# Patient Record
Sex: FEMALE | Race: WHITE | NOT HISPANIC OR LATINO | Employment: PART TIME
[De-identification: names, ages, dates, MRNs, and addresses within clinical notes are randomized per-mention and may not be internally consistent; named-entity substitution may affect disease eponyms.]

---

## 2017-07-31 ENCOUNTER — RECORDS - HEALTHEAST (OUTPATIENT)
Dept: ADMINISTRATIVE | Facility: OTHER | Age: 52
End: 2017-07-31

## 2017-08-08 ENCOUNTER — AMBULATORY - HEALTHEAST (OUTPATIENT)
Dept: PULMONOLOGY | Facility: OTHER | Age: 52
End: 2017-08-08

## 2017-08-08 ENCOUNTER — COMMUNICATION - HEALTHEAST (OUTPATIENT)
Dept: PULMONOLOGY | Facility: OTHER | Age: 52
End: 2017-08-08

## 2017-08-08 DIAGNOSIS — J44.9 COPD (CHRONIC OBSTRUCTIVE PULMONARY DISEASE) (H): ICD-10-CM

## 2017-08-15 ENCOUNTER — COMMUNICATION - HEALTHEAST (OUTPATIENT)
Dept: PULMONOLOGY | Facility: OTHER | Age: 52
End: 2017-08-15

## 2017-09-11 ENCOUNTER — HOSPITAL ENCOUNTER (OUTPATIENT)
Dept: RESPIRATORY THERAPY | Facility: CLINIC | Age: 52
Discharge: HOME | End: 2017-09-11
Attending: INTERNAL MEDICINE

## 2017-09-11 DIAGNOSIS — J44.9 COPD (CHRONIC OBSTRUCTIVE PULMONARY DISEASE) (H): ICD-10-CM

## 2017-09-18 ENCOUNTER — RECORDS - HEALTHEAST (OUTPATIENT)
Dept: ADMINISTRATIVE | Facility: OTHER | Age: 52
End: 2017-09-18

## 2017-09-18 ENCOUNTER — OFFICE VISIT - HEALTHEAST (OUTPATIENT)
Dept: PULMONOLOGY | Facility: OTHER | Age: 52
End: 2017-09-18

## 2017-09-18 DIAGNOSIS — J44.9 CHRONIC OBSTRUCTIVE PULMONARY DISEASE, UNSPECIFIED COPD TYPE (H): ICD-10-CM

## 2017-09-18 ASSESSMENT — MIFFLIN-ST. JEOR: SCORE: 1638.69

## 2017-11-06 ENCOUNTER — AMBULATORY - HEALTHEAST (OUTPATIENT)
Dept: PULMONOLOGY | Facility: OTHER | Age: 52
End: 2017-11-06

## 2017-11-06 DIAGNOSIS — J44.9 COPD (CHRONIC OBSTRUCTIVE PULMONARY DISEASE) (H): ICD-10-CM

## 2017-12-11 ENCOUNTER — OFFICE VISIT - HEALTHEAST (OUTPATIENT)
Dept: PULMONOLOGY | Facility: OTHER | Age: 52
End: 2017-12-11

## 2017-12-11 DIAGNOSIS — J45.901 ASTHMA EXACERBATION: ICD-10-CM

## 2018-01-08 ENCOUNTER — COMMUNICATION - HEALTHEAST (OUTPATIENT)
Dept: PULMONOLOGY | Facility: OTHER | Age: 53
End: 2018-01-08

## 2018-01-08 DIAGNOSIS — J44.9 CHRONIC OBSTRUCTIVE PULMONARY DISEASE, UNSPECIFIED COPD TYPE (H): ICD-10-CM

## 2018-02-12 ENCOUNTER — COMMUNICATION - HEALTHEAST (OUTPATIENT)
Dept: PULMONOLOGY | Facility: OTHER | Age: 53
End: 2018-02-12

## 2018-02-13 ENCOUNTER — AMBULATORY - HEALTHEAST (OUTPATIENT)
Dept: PULMONOLOGY | Facility: OTHER | Age: 53
End: 2018-02-13

## 2018-02-13 ENCOUNTER — OFFICE VISIT - HEALTHEAST (OUTPATIENT)
Dept: PULMONOLOGY | Facility: OTHER | Age: 53
End: 2018-02-13

## 2018-02-13 DIAGNOSIS — R91.1 LUNG NODULE: ICD-10-CM

## 2018-02-13 DIAGNOSIS — J45.901 EXACERBATION OF ASTHMA, UNSPECIFIED ASTHMA SEVERITY, UNSPECIFIED WHETHER PERSISTENT: ICD-10-CM

## 2018-02-13 DIAGNOSIS — K21.9 GASTROESOPHAGEAL REFLUX DISEASE WITHOUT ESOPHAGITIS: ICD-10-CM

## 2018-02-13 DIAGNOSIS — Z72.0 TOBACCO USE: ICD-10-CM

## 2018-02-13 DIAGNOSIS — J20.9 ACUTE BRONCHITIS, UNSPECIFIED ORGANISM: ICD-10-CM

## 2018-02-13 DIAGNOSIS — J44.9 CHRONIC OBSTRUCTIVE PULMONARY DISEASE, UNSPECIFIED COPD TYPE (H): ICD-10-CM

## 2018-02-13 DIAGNOSIS — M79.89 LEG SWELLING: ICD-10-CM

## 2018-02-15 ENCOUNTER — COMMUNICATION - HEALTHEAST (OUTPATIENT)
Dept: PULMONOLOGY | Facility: OTHER | Age: 53
End: 2018-02-15

## 2018-03-22 ENCOUNTER — AMBULATORY - HEALTHEAST (OUTPATIENT)
Dept: PULMONOLOGY | Facility: OTHER | Age: 53
End: 2018-03-22

## 2018-03-22 ENCOUNTER — OFFICE VISIT - HEALTHEAST (OUTPATIENT)
Dept: PULMONOLOGY | Facility: OTHER | Age: 53
End: 2018-03-22

## 2018-03-22 DIAGNOSIS — J45.909 ASTHMA: ICD-10-CM

## 2018-07-09 ENCOUNTER — OFFICE VISIT - HEALTHEAST (OUTPATIENT)
Dept: PULMONOLOGY | Facility: OTHER | Age: 53
End: 2018-07-09

## 2018-07-09 DIAGNOSIS — J45.909 ASTHMA: ICD-10-CM

## 2018-08-12 ENCOUNTER — COMMUNICATION - HEALTHEAST (OUTPATIENT)
Dept: PULMONOLOGY | Facility: OTHER | Age: 53
End: 2018-08-12

## 2018-08-12 DIAGNOSIS — J44.9 CHRONIC OBSTRUCTIVE PULMONARY DISEASE, UNSPECIFIED COPD TYPE (H): ICD-10-CM

## 2018-08-22 ENCOUNTER — HOSPITAL ENCOUNTER (OUTPATIENT)
Dept: CT IMAGING | Facility: CLINIC | Age: 53
Discharge: HOME | End: 2018-08-22
Attending: INTERNAL MEDICINE

## 2018-08-22 DIAGNOSIS — J44.9 CHRONIC OBSTRUCTIVE PULMONARY DISEASE, UNSPECIFIED COPD TYPE (H): ICD-10-CM

## 2018-08-22 DIAGNOSIS — J45.901 EXACERBATION OF ASTHMA, UNSPECIFIED ASTHMA SEVERITY, UNSPECIFIED WHETHER PERSISTENT: ICD-10-CM

## 2018-08-22 DIAGNOSIS — M79.89 LEG SWELLING: ICD-10-CM

## 2018-08-22 DIAGNOSIS — R91.1 LUNG NODULE: ICD-10-CM

## 2018-08-22 DIAGNOSIS — K21.9 GASTROESOPHAGEAL REFLUX DISEASE WITHOUT ESOPHAGITIS: ICD-10-CM

## 2018-08-22 DIAGNOSIS — Z72.0 TOBACCO USE: ICD-10-CM

## 2018-08-22 DIAGNOSIS — J20.9 ACUTE BRONCHITIS, UNSPECIFIED ORGANISM: ICD-10-CM

## 2018-10-05 ENCOUNTER — AMBULATORY - HEALTHEAST (OUTPATIENT)
Dept: PULMONOLOGY | Facility: OTHER | Age: 53
End: 2018-10-05

## 2018-10-05 DIAGNOSIS — J45.909 ASTHMA: ICD-10-CM

## 2018-10-06 ENCOUNTER — COMMUNICATION - HEALTHEAST (OUTPATIENT)
Dept: PULMONOLOGY | Facility: OTHER | Age: 53
End: 2018-10-06

## 2018-10-06 DIAGNOSIS — J45.21 MILD INTERMITTENT ASTHMA WITH EXACERBATION: ICD-10-CM

## 2018-11-17 ENCOUNTER — COMMUNICATION - HEALTHEAST (OUTPATIENT)
Dept: PULMONOLOGY | Facility: OTHER | Age: 53
End: 2018-11-17

## 2018-11-17 DIAGNOSIS — J45.21 MILD INTERMITTENT ASTHMA WITH EXACERBATION: ICD-10-CM

## 2019-03-21 ENCOUNTER — RECORDS - HEALTHEAST (OUTPATIENT)
Dept: LAB | Facility: CLINIC | Age: 54
End: 2019-03-21

## 2019-03-22 LAB — BNP SERPL-MCNC: 17 PG/ML (ref 0–80)

## 2019-03-25 ENCOUNTER — RECORDS - HEALTHEAST (OUTPATIENT)
Dept: LAB | Facility: CLINIC | Age: 54
End: 2019-03-25

## 2019-03-25 LAB
ALBUMIN SERPL-MCNC: 3.9 G/DL (ref 3.5–5)
ALP SERPL-CCNC: 136 U/L (ref 45–120)
ALT SERPL W P-5'-P-CCNC: 39 U/L (ref 0–45)
ANION GAP SERPL CALCULATED.3IONS-SCNC: 14 MMOL/L (ref 5–18)
AST SERPL W P-5'-P-CCNC: 25 U/L (ref 0–40)
BILIRUB SERPL-MCNC: 0.4 MG/DL (ref 0–1)
BUN SERPL-MCNC: 9 MG/DL (ref 8–22)
C REACTIVE PROTEIN LHE: 3.7 MG/DL (ref 0–0.8)
CALCIUM SERPL-MCNC: 10 MG/DL (ref 8.5–10.5)
CHLORIDE BLD-SCNC: 94 MMOL/L (ref 98–107)
CK SERPL-CCNC: 192 U/L (ref 30–190)
CO2 SERPL-SCNC: 30 MMOL/L (ref 22–31)
CREAT SERPL-MCNC: 0.78 MG/DL (ref 0.6–1.1)
ERYTHROCYTE [SEDIMENTATION RATE] IN BLOOD BY WESTERGREN METHOD: 55 MM/HR (ref 0–20)
GFR SERPL CREATININE-BSD FRML MDRD: >60 ML/MIN/1.73M2
GLUCOSE BLD-MCNC: 131 MG/DL (ref 70–125)
POTASSIUM BLD-SCNC: 3.8 MMOL/L (ref 3.5–5)
PROT SERPL-MCNC: 8 G/DL (ref 6–8)
SODIUM SERPL-SCNC: 138 MMOL/L (ref 136–145)
TSH SERPL DL<=0.005 MIU/L-ACNC: 0.96 UIU/ML (ref 0.3–5)

## 2019-03-27 ENCOUNTER — RECORDS - HEALTHEAST (OUTPATIENT)
Dept: LAB | Facility: CLINIC | Age: 54
End: 2019-03-27

## 2019-03-27 LAB — RHEUMATOID FACT SERPL-ACNC: <15 IU/ML (ref 0–30)

## 2019-03-28 LAB
ANA SER QL: 0.5 U
CCP AB SER IA-ACNC: 1.9 U/ML

## 2019-04-15 ENCOUNTER — COMMUNICATION - HEALTHEAST (OUTPATIENT)
Dept: PULMONOLOGY | Facility: OTHER | Age: 54
End: 2019-04-15

## 2019-04-15 DIAGNOSIS — J45.909 ASTHMA: ICD-10-CM

## 2019-04-29 ENCOUNTER — RECORDS - HEALTHEAST (OUTPATIENT)
Dept: LAB | Facility: CLINIC | Age: 54
End: 2019-04-29

## 2019-04-29 LAB
C REACTIVE PROTEIN LHE: 2.1 MG/DL (ref 0–0.8)
ERYTHROCYTE [SEDIMENTATION RATE] IN BLOOD BY WESTERGREN METHOD: 40 MM/HR (ref 0–20)

## 2019-05-06 ENCOUNTER — HOSPITAL ENCOUNTER (OUTPATIENT)
Dept: MAMMOGRAPHY | Facility: CLINIC | Age: 54
Discharge: HOME | End: 2019-05-06
Attending: FAMILY MEDICINE

## 2019-05-06 DIAGNOSIS — Z12.31 VISIT FOR SCREENING MAMMOGRAM: ICD-10-CM

## 2019-07-29 ENCOUNTER — AMBULATORY - HEALTHEAST (OUTPATIENT)
Dept: PULMONOLOGY | Facility: OTHER | Age: 54
End: 2019-07-29

## 2019-07-29 ENCOUNTER — COMMUNICATION - HEALTHEAST (OUTPATIENT)
Dept: PULMONOLOGY | Facility: OTHER | Age: 54
End: 2019-07-29

## 2019-07-29 DIAGNOSIS — J44.89 CHRONIC OBSTRUCTIVE AIRWAY DISEASE WITH ASTHMA (H): ICD-10-CM

## 2019-08-29 ENCOUNTER — OFFICE VISIT - HEALTHEAST (OUTPATIENT)
Dept: PULMONOLOGY | Facility: OTHER | Age: 54
End: 2019-08-29

## 2019-08-29 DIAGNOSIS — J44.89 CHRONIC OBSTRUCTIVE AIRWAY DISEASE WITH ASTHMA (H): ICD-10-CM

## 2019-08-29 DIAGNOSIS — Z72.0 TOBACCO USE: ICD-10-CM

## 2019-11-08 ENCOUNTER — AMBULATORY - HEALTHEAST (OUTPATIENT)
Dept: PULMONOLOGY | Facility: OTHER | Age: 54
End: 2019-11-08

## 2020-04-28 ENCOUNTER — OFFICE VISIT - HEALTHEAST (OUTPATIENT)
Dept: FAMILY MEDICINE | Facility: CLINIC | Age: 55
End: 2020-04-28

## 2020-04-28 ENCOUNTER — VIRTUAL VISIT (OUTPATIENT)
Dept: FAMILY MEDICINE | Facility: OTHER | Age: 55
End: 2020-04-28

## 2020-04-28 DIAGNOSIS — R05.9 COUGH: ICD-10-CM

## 2020-04-28 NOTE — PROGRESS NOTES
"Date: 2020 10:33:51  Clinician: Wayne Dickerson  Clinician NPI: [id number]  Patient: Rosa Arreguin  Patient : 1965  Patient Address: [facility] [place] [address]  Patient Phone: [phone]  Visit Protocol: URI  Patient Summary:  Rosa is a 55 year old ( : 1965 ) female who initiated a Visit for COVID-19 (Coronavirus) evaluation and screening. When asked the question \"Please sign me up to receive news, health information and promotions from [employer].\", Rosa responded \"No\".    Rosa states her symptoms started 1-2 days ago.   Her symptoms consist of a sore throat and wheezing.   Symptom details     Sore throat: Rosa reports having mild throat pain (1-3 on a 10 point pain scale), does not have exudate on her tonsils, and can swallow liquids. The lymph nodes in her neck are not enlarged. A rash has not appeared on the skin since the sore throat started.     Wheezing: Rosa has been diagnosed with asthma. The wheezing does not interfere with her normal daily activities.     Rosa denies having facial pain or pressure, cough, nasal congestion, malaise, ear pain, fever, myalgias, rhinitis, headache, enlarged lymph nodes, chills, vomiting, nausea, teeth pain, ageusia, anosmia, and diarrhea. She also denies taking antibiotic medication for the symptoms and having recent facial or sinus surgery in the past 60 days. She is not experiencing dyspnea.   Precipitating events  Within the past week, Rosa has not been exposed to someone with strep throat.   Pertinent COVID-19 (Coronavirus) information  Rosa has not traveled internationally or to the areas where COVID-19 (Coronavirus) is widespread, including cruise ship travel in the last 14 days before the start of her symptoms.   Rosa does not work or volunteer as healthcare worker or a  and does not work or volunteer in a healthcare facility.   She does not live with a healthcare worker.   Rosa has not had a close " contact with a laboratory-confirmed COVID-19 patient within 14 days of symptom onset. She also has not had a close contact with a suspected COVID-19 patient within 14 days of symptom onset.   Pertinent medical history  Rosa does not get yeast infections when she takes antibiotics.   Rosa does not need a return to work/school note.   Weight: 240 lbs   Rosa does not smoke or use smokeless tobacco.   Additional information as reported by the patient (free text): I take medications for asthma and COPD   Weight: 240 lbs    MEDICATIONS: No current medications, ALLERGIES: NKDA  Clinician Response:  Dear Rosa,   Your symptoms show that you may have coronavirus (COVID-19). This illness can cause fever, cough and trouble breathing. Many people get a mild case and get better on their own. Some people can get very sick.   Will I be tested for COVID-19?  We would recommend you be tested for coronavirus. Here is how to get that scheduled:   Call [phone]. Tell them you were referred by [facility] to have a COVID-19 test. You will be scheduled at one of our [facility] testing locations (drive-up). Please have your [facility] visit information ready when you call including your visit ID number to verify you were referred.    How can I protect others in the meantime?  First, stay home and away from others (self-isolate) until:   You've had no fever---and no medicine that reduces fever---for 3 full days (72 hours). And...    Your other symptoms have gotten better. For example, your cough or breathing has improved. And...    At least 7 days have passed since your symptoms started.   During this time:   Don't go to work, school or anywhere else.     Stay away from others in your home. No hugging, kissing or shaking hands.    Don't let anyone visit.    Cover your mouth and nose with a mask, tissue or wash cloth to avoid spreading germs.    Wash your hands and face often. Use soap and water.   How can I take care of myself?  1.Take  Tylenol (acetaminophen) for fever or pain. If you have liver or kidney problems, ask your family doctor if it's okay to take Tylenol.   Adults can take either:    650 mg (two 325 mg pills) every 4 to 6 hours, or...    1,000 mg (two 500 mg pills) every 8 hours as needed.     Note: Don't take more than 3,000 mg in one day.   For children, check the Tylenol bottle for the right dose. The dose is based on the child's age or weight.  2.If you have other health problems (like cancer, heart failure, an organ transplant or severe kidney disease): Call your specialty clinic if you don't feel better in the next 2 days.  3.Know when to call 911: If your breathing is so bad that it keeps you from doing normal activities, call 911 or go to the emergency room. Tell them that you've been staying home and may have COVID-19.  4.Sign up for [employer]. We know it's scary to hear that you might have COVID-19. We want to track your symptoms to make sure you're okay over the next 2 weeks. Please look for an email from [employer]---this is a free, online program that we'll use to keep in touch. To sign up, follow the link in the email. Learn more at [email or web address].  Where can I get more information?  To learn more about COVID-19 and how to care for yourself at home, please visit the CDC website at https://www.cdc.gov/coronavirus/2019-ncov/about/steps-when-sick.html.  For more about your care at [facility], please visit [email or web address]     COVID-19 (Coronavirus) General Information  Because there is currently no vaccine to prevent infection, the best way to protect yourself is to avoid being exposed to this virus. Common symptoms of COVID-19 include but are not limited to fever, cough, and shortness of breath. These symptoms appear 2-14 days after you are exposed to the virus that causes COVID-19. Click here for more information from the CDC on how to protect yourself.  If you are  sick with COVID-19 or suspect you are infected with the virus that causes COVID-19, follow the steps here from the CDC to help prevent the disease from spreading to people in your home and community.  Click here for general information from the CDC on testing.  If you develop any of these emergency warning signs for COVID-19, get medical attention immediately:     Trouble breathing    Persistent pain or pressure in the chest    New confusion or inability to arouse    Bluish lips or face      Call your doctor or clinic before going in. Call 911 if you have a medical emergency and notify the  you have or think you may have COVID-19.  For more detailed and up to date information on COVID-19 (Coronavirus), please visit the CDC website.   Diagnosis: Wheezing  Diagnosis ICD: R06.2  Addendum created: April 28 12:00:29, 2020 created by: Kee Lozano body: I reviewed the e-visit and discussed the patient with the resident and agree with the resident's assessment and plan of care as documented.

## 2020-04-30 ENCOUNTER — COMMUNICATION - HEALTHEAST (OUTPATIENT)
Dept: FAMILY MEDICINE | Facility: CLINIC | Age: 55
End: 2020-04-30

## 2020-05-04 ENCOUNTER — COMMUNICATION - HEALTHEAST (OUTPATIENT)
Dept: SCHEDULING | Facility: CLINIC | Age: 55
End: 2020-05-04

## 2020-05-05 ENCOUNTER — COMMUNICATION - HEALTHEAST (OUTPATIENT)
Dept: PULMONOLOGY | Facility: OTHER | Age: 55
End: 2020-05-05

## 2020-05-05 ENCOUNTER — AMBULATORY - HEALTHEAST (OUTPATIENT)
Dept: PULMONOLOGY | Facility: OTHER | Age: 55
End: 2020-05-05

## 2020-05-05 DIAGNOSIS — J45.909 ASTHMA: ICD-10-CM

## 2020-06-01 ENCOUNTER — AMBULATORY - HEALTHEAST (OUTPATIENT)
Dept: PULMONOLOGY | Facility: OTHER | Age: 55
End: 2020-06-01

## 2020-06-01 DIAGNOSIS — J45.909 ASTHMA: ICD-10-CM

## 2020-06-23 ENCOUNTER — OFFICE VISIT - HEALTHEAST (OUTPATIENT)
Dept: PULMONOLOGY | Facility: OTHER | Age: 55
End: 2020-06-23

## 2020-06-23 DIAGNOSIS — J44.89 CHRONIC OBSTRUCTIVE AIRWAY DISEASE WITH ASTHMA (H): ICD-10-CM

## 2020-06-23 DIAGNOSIS — R63.5 WEIGHT GAIN: ICD-10-CM

## 2020-06-23 ASSESSMENT — MIFFLIN-ST. JEOR: SCORE: 1679.51

## 2020-07-14 ENCOUNTER — TRANSCRIBE ORDERS (OUTPATIENT)
Dept: OTHER | Age: 55
End: 2020-07-14

## 2020-07-14 DIAGNOSIS — R63.5 WEIGHT GAIN: Primary | ICD-10-CM

## 2020-07-24 ENCOUNTER — DOCUMENTATION ONLY (OUTPATIENT)
Dept: CARE COORDINATION | Facility: CLINIC | Age: 55
End: 2020-07-24

## 2020-07-29 NOTE — TELEPHONE ENCOUNTER
RECORDS RECEIVED FROM: External   DATE RECEIVED: 8.11.20   NOTES (FOR ALL VISITS) STATUS DETAILS   OFFICE NOTES from referring provider Care Everywhere 7.14.20 [place], [place]   OFFICE NOTES from other specialist N/A    ED NOTES N/A    OPERATIVE REPORT  (thyroid, pituitary, adrenal, parathyroid) N/A    MEDICATION LIST Care Everywhere    IMAGING      DEXASCAN N/A    MRI (BRAIN) N/A    XR (Chest) In process 3.17.19 [facility]   CT (HEAD/NECK/CHEST/ABDOMEN) In process 8.22.18 [facility]   NUCLEAR  N/A    ULTRASOUND (HEAD/NECK) N/A    LABS     DIABETES: HBGA1C, CREATININE, FASTING LIPIDS, MICROALBUMIN URINE, POTASSIUM, TSH, T4    THYROID: TSH, T4, CBC, THYRODLONULIN, TOTAL T3, FREE T4, CALCITONIN, CEA Care Everywhere              Action MJ 7.29.2020   Action Taken Requested images from [facility]-- received sV

## 2020-08-11 ENCOUNTER — VIRTUAL VISIT (OUTPATIENT)
Dept: ENDOCRINOLOGY | Facility: CLINIC | Age: 55
End: 2020-08-11
Attending: INTERNAL MEDICINE
Payer: COMMERCIAL

## 2020-08-11 ENCOUNTER — PRE VISIT (OUTPATIENT)
Dept: ENDOCRINOLOGY | Facility: CLINIC | Age: 55
End: 2020-08-11

## 2020-08-11 DIAGNOSIS — E66.9 OBESITY, UNSPECIFIED CLASSIFICATION, UNSPECIFIED OBESITY TYPE, UNSPECIFIED WHETHER SERIOUS COMORBIDITY PRESENT: Primary | ICD-10-CM

## 2020-08-11 NOTE — LETTER
Date:August 17, 2020      Provider requested that no letter be sent. Do not send.       [facility] Information

## 2020-08-11 NOTE — PROGRESS NOTES
Patient did not join the video visit.   Please reschedule to weight management clinic.    Tab Johnson MD  Endocrinology, Diabetes and Metabolism  [facility]

## 2020-08-11 NOTE — LETTER
8/11/2020       RE: Rosa Arreguin  [address] [place] [address]     Dear Colleague,    Thank you for referring your patient, Rosa Arreguin, to the [facility] ENDOCRINOLOGY at [facility]. Please see a copy of my visit note below.    Patient did not join the video visit.   Please reschedule to weight management clinic.    Tab Johnson MD  Endocrinology, Diabetes and Metabolism  [facility]      Again, thank you for allowing me to participate in the care of your patient.      Sincerely,    Tab Johnson MD

## 2020-09-01 ENCOUNTER — TELEPHONE (OUTPATIENT)
Dept: ENDOCRINOLOGY | Facility: CLINIC | Age: 55
End: 2020-09-01

## 2020-09-01 ENCOUNTER — VIRTUAL VISIT (OUTPATIENT)
Dept: ENDOCRINOLOGY | Facility: CLINIC | Age: 55
End: 2020-09-01
Payer: COMMERCIAL

## 2020-09-01 ENCOUNTER — PRE VISIT (OUTPATIENT)
Dept: ENDOCRINOLOGY | Facility: CLINIC | Age: 55
End: 2020-09-01

## 2020-09-01 ENCOUNTER — RECORDS - HEALTHEAST (OUTPATIENT)
Dept: ADMINISTRATIVE | Facility: OTHER | Age: 55
End: 2020-09-01

## 2020-09-01 DIAGNOSIS — J45.909 ASTHMA, UNSPECIFIED ASTHMA SEVERITY, UNSPECIFIED WHETHER COMPLICATED, UNSPECIFIED WHETHER PERSISTENT: ICD-10-CM

## 2020-09-01 DIAGNOSIS — E66.01 MORBID OBESITY (H): Primary | ICD-10-CM

## 2020-09-01 PROBLEM — F32.A DEPRESSION: Status: ACTIVE | Noted: 2020-09-01

## 2020-09-01 RX ORDER — PHENTERMINE HYDROCHLORIDE 15 MG/1
15 CAPSULE ORAL EVERY MORNING
Qty: 30 CAPSULE | Refills: 3 | Status: SHIPPED | OUTPATIENT
Start: 2020-09-01 | End: 2020-11-02

## 2020-09-01 RX ORDER — MOMETASONE FUROATE AND FORMOTEROL FUMARATE DIHYDRATE 200; 5 UG/1; UG/1
AEROSOL RESPIRATORY (INHALATION)
COMMUNITY
Start: 2020-07-24 | End: 2022-04-04

## 2020-09-01 RX ORDER — ALBUTEROL SULFATE 90 UG/1
AEROSOL, METERED RESPIRATORY (INHALATION)
COMMUNITY
Start: 2020-05-06 | End: 2022-04-04

## 2020-09-01 RX ORDER — IBUPROFEN 200 MG
400 TABLET ORAL
COMMUNITY
End: 2020-09-30

## 2020-09-01 RX ORDER — NICOTINE 21 MG/24HR
PATCH, TRANSDERMAL 24 HOURS TRANSDERMAL
COMMUNITY

## 2020-09-01 RX ORDER — TIOTROPIUM BROMIDE INHALATION SPRAY 3.12 UG/1
SPRAY, METERED RESPIRATORY (INHALATION)
COMMUNITY
Start: 2020-08-08 | End: 2022-06-03

## 2020-09-01 RX ORDER — ALBUTEROL SULFATE 90 UG/1
AEROSOL, METERED RESPIRATORY (INHALATION)
COMMUNITY
Start: 2018-11-19 | End: 2020-09-30

## 2020-09-01 RX ORDER — TOPIRAMATE 25 MG/1
TABLET, FILM COATED ORAL
Qty: 90 TABLET | Refills: 3 | Status: SHIPPED | OUTPATIENT
Start: 2020-09-01 | End: 2020-11-02

## 2020-09-01 RX ORDER — POLYETHYLENE GLYCOL 3350 17 G/17G
POWDER, FOR SOLUTION ORAL
COMMUNITY
Start: 2018-11-12

## 2020-09-01 NOTE — PROGRESS NOTES
"Rosa Arreguin is a 55 year old female who is being evaluated via a billable video visit.      The patient has been notified of following:     \"This video visit will be conducted via a call between you and your physician/provider. We have found that certain health care needs can be provided without the need for an in-person physical exam.  This service lets us provide the care you need with a video conversation.  If a prescription is necessary we can send it directly to your pharmacy.  If lab work is needed we can place an order for that and you can then stop by our lab to have the test done at a later time.    Video visits are billed at different rates depending on your insurance coverage.  Please reach out to your insurance provider with any questions.    If during the course of the call the physician/provider feels a video visit is not appropriate, you will not be charged for this service.\"    Patient has given verbal consent for Video visit? Yes  How would you like to obtain your AVS? MyChart  If you are dropped from the video visit, the video invite should be resent to: Send to e-mail at: [email or web address]  Will anyone else be joining your video visit? No        Video-Visit Details    Type of service:  Video Visit    Video Start Time: 9:00 am  Video End Time: 9:50 am    Originating Location (pt. Location): Home    Distant Location (provider location):  [facility] ENDOCRINOLOGY     Platform used for Video Visit: [facility]    Tab Johnson MD      [facility] Weight Management Consult    PATIENT:  Rosa Arreguin  MRN:         [id number]  :         1965    Dear Clinic, [facility],    I had the pleasure of seeing your patient, Rosa Arreguin.  Full intake/assessment done to determine barriers to weight loss success and develop a treatment plan.  Rosa Arreguin is a 55 year old female interested in treatment of medical problems associated with weight.  Has COPD, asthma, emphysema.     She was " referred by her pulmonary doctor for weight loss.     Weight in early adulthood was around 120 lbs.   She had her daughter at age 40. Weight before pregnancy was 120 lbs. She gained only 7 lbs, then she lost weight after delivery, and her weight went back to 120 lbs.     Stopped using cocaine since having her baby at age 40.   Drank a lot of pop/beer and ate a lot of food.     Weight in the 220s until covid-19, then stayed more at home, and gained to max of 250 lbs.     In the past 2 weeks, she has been able to lose 4 lbs to 246 lbs.   Current diet:   Quit pop altogether.   Breakfast: 9 am: fruits  Lunch: 1 pm salad + meat  Dinner: varies, watching portions    Having severe sugar cravings since starting her diet. Mostly after dinner.  May or may not have snacks in afternoon or bedtime.     Current weight is 250 lbs.     Exercise: Walks or treadmill for 20 min a day    No history of HTN or CVD. No diabetes.   No smoking (quit Jan 2020).   She takes hydrochlorothiazide (started due to shortness of breath, not due to HTN).       ROS   Constitutional: no fevers, chills, night sweats. No weight loss or fatigue. Good appetite  Eyes: no vision changes, no eye redness, no diplopia  Ears, Nose, mouth, throat: no hearing changes, no tinnitus, no rhinorrhea, no nasal congestion  Cardiovascular: no chest pain, no orthopnea or PND, no edema, no palpitations  Respiratory: no dyspnea, no cough, no sputum, no wheezing  Gastrointestinal: no nausea, no vomiting, no abdominal pain, no diarrhea, no constipation  Genitourinary: no dysuria, no frequency, no urgency, no nocturia  Musculoskeletal: no joint pains, no back pain, no cramps, no fractures  Skin: no rash, no itching, no dryness, no ulcers, no hair loss  Neurological: no headache, no weakness, no numbness, no dizziness, no tremors  Psychiatric: no anxiety, no sadness  Hematologic/lymphatic: no easy bruising, no bleeding, no palor      PAST MEDICAL HISTORY:  Patient Active Problem  List   Diagnosis     Asthma     Depression       MEDICATIONS:   Current Outpatient Medications   Medication Sig Dispense Refill     albuterol (VENTOLIN HFA) 108 (90 Base) MCG/ACT inhaler INHALE 2 PUFFS BY MOUTH FOUR TIMES DAILY AS NEEDED FOR COUGH OR WHEEZING OR SHORTNESS OF BREATH       mometasone-formoterol (DULERA) 200-5 MCG/ACT inhaler Inhale 2 puffs into the lungs       polyethylene glycol (MIRALAX) 17 g packet as needed.       tiotropium (SPIRIVA RESPIMAT) 2.5 MCG/ACT inhaler Inhale 2 puffs into the lungs       albuterol (PROAIR HFA/PROVENTIL HFA/VENTOLIN HFA) 108 (90 Base) MCG/ACT inhaler INHALE TWO PUFFS BY MOUTH FOUR TIMES DAILY AS NEEDED FOR cough/wheeze/SHORTNESS OF BREATH       DULERA 200-5 MCG/ACT inhaler INHALE 2 PUFFS BY MOUTH TWICE DAILY       ibuprofen (ADVIL/MOTRIN) 200 MG tablet Take 400 mg by mouth       nicotine (NICODERM CQ) 21 MG/24HR 24 hr patch as needed.       SPIRIVA RESPIMAT 2.5 MCG/ACT inhaler Inhale 2 puffs daily.         ALLERGIES:   Allergies   Allergen Reactions     Amoxicillin Rash     Patient notes she had previously tolerated this until recently when she was prescribed a higher dose       PHYSICAL EXAM:  There were no vitals taken for this visit.   GENERAL: Healthy, alert and no distress  EYES: Eyes grossly normal to inspection.  No discharge or erythema, or obvious scleral/conjunctival abnormalities.  RESP: No audible wheeze, cough, or visible cyanosis.  No visible retractions or increased work of breathing.    SKIN: Visible skin clear. No significant rash, abnormal pigmentation or lesions.  NEURO: Cranial nerves grossly intact.  Mentation and speech appropriate for age.  PSYCH: Mentation appears normal, affect normal/bright, judgement and insight intact, normal speech and appearance well-groomed.    Location of obesity: Central Obesity    ASSESSMENT:  Rosa is a patient with mature onset  obesity with significant element of familial/genetic influence and with current health  consequences. She does need aggressive weight loss plan due to morbid obesity, health comorbidities.  Rosa Arreguin eats a high carb diet, eats a high fat diet, uses food as a reward and has perception of intense hunger.    Her problem is complicated by a hunger disorder, strong craving/reward pathways and poor lifestyle choices      PLAN:    Diet:   - reduce calories and track: aim for <1500 Kcal/day  - improve quality, avoiding pop  - try IF 16/8 some nights of the week      Medication:  The patient will begin medication in pursuit of improved medical status as influenced by body weight. She will start phentermine and topiramate. Patient was made aware that topiramate is not approved for the treatment of obesity.  There is a mutual understanding of the goals and risks of this therapy. The patient is in agreement. She is educated on dosage regimen and possible side effects.      RTC:    4 weeks with [facility] pharmacy, 8 weeks with me  Both in medical weight management clinic.     TIME: 45 min spent on evaluation, management, counseling, education, & motivational interviewing with greater than 50 % of the total time was spent on counseling and coordinating care    Sincerely,    Tab Johnson MD

## 2020-09-01 NOTE — LETTER
"2020       RE: Rosa Arreguin  [address] [place] [address]     Dear Colleague,    Thank you for referring your patient, Rosa Arreguin, to the [facility] ENDOCRINOLOGY at [facility]. Please see a copy of my visit note below.    Rosa Arreguin is a 55 year old female who is being evaluated via a billable video visit.      The patient has been notified of following:     \"This video visit will be conducted via a call between you and your physician/provider. We have found that certain health care needs can be provided without the need for an in-person physical exam.  This service lets us provide the care you need with a video conversation.  If a prescription is necessary we can send it directly to your pharmacy.  If lab work is needed we can place an order for that and you can then stop by our lab to have the test done at a later time.    Video visits are billed at different rates depending on your insurance coverage.  Please reach out to your insurance provider with any questions.    If during the course of the call the physician/provider feels a video visit is not appropriate, you will not be charged for this service.\"    Patient has given verbal consent for Video visit? Yes  How would you like to obtain your AVS? MyChart  If you are dropped from the video visit, the video invite should be resent to: Send to e-mail at: [email or web address]  Will anyone else be joining your video visit? No        Video-Visit Details    Type of service:  Video Visit    Video Start Time: 9:00 am  Video End Time: 9:50 am    Originating Location (pt. Location): Home    Distant Location (provider location):  [facility] ENDOCRINOLOGY     Platform used for Video Visit: Patricia Johnson MD      [facility] Weight Management Consult    PATIENT:  Rosa Arreguin  MRN:         [id number]  :         1965    Dear Clinic, [facility],    I had the pleasure of seeing your " patient, Rosa Arreguin.  Full intake/assessment done to determine barriers to weight loss success and develop a treatment plan.  Rosa Arreguin is a 55 year old female interested in treatment of medical problems associated with weight.  Has COPD, asthma, emphysema.     She was referred by her pulmonary doctor for weight loss.     Weight in early adulthood was around 120 lbs.   She had her daughter at age 40. Weight before pregnancy was 120 lbs. She gained only 7 lbs, then she lost weight after delivery, and her weight went back to 120 lbs.     Stopped using cocaine since having her baby at age 40.   Drank a lot of pop/beer and ate a lot of food.     Weight in the 220s until covid-19, then stayed more at home, and gained to max of 250 lbs.     In the past 2 weeks, she has been able to lose 4 lbs to 246 lbs.   Current diet:   Quit pop altogether.   Breakfast: 9 am: fruits  Lunch: 1 pm salad + meat  Dinner: varies, watching portions    Having severe sugar cravings since starting her diet. Mostly after dinner.  May or may not have snacks in afternoon or bedtime.     Current weight is 250 lbs.     Exercise: Walks or treadmill for 20 min a day    No history of HTN or CVD. No diabetes.   No smoking (quit Jan 2020).   She takes hydrochlorothiazide (started due to shortness of breath, not due to HTN).       ROS   Constitutional: no fevers, chills, night sweats. No weight loss or fatigue. Good appetite  Eyes: no vision changes, no eye redness, no diplopia  Ears, Nose, mouth, throat: no hearing changes, no tinnitus, no rhinorrhea, no nasal congestion  Cardiovascular: no chest pain, no orthopnea or PND, no edema, no palpitations  Respiratory: no dyspnea, no cough, no sputum, no wheezing  Gastrointestinal: no nausea, no vomiting, no abdominal pain, no diarrhea, no constipation  Genitourinary: no dysuria, no frequency, no urgency, no nocturia  Musculoskeletal: no joint pains, no back pain, no cramps, no fractures  Skin: no rash,  no itching, no dryness, no ulcers, no hair loss  Neurological: no headache, no weakness, no numbness, no dizziness, no tremors  Psychiatric: no anxiety, no sadness  Hematologic/lymphatic: no easy bruising, no bleeding, no palor      PAST MEDICAL HISTORY:  Patient Active Problem List   Diagnosis     Asthma     Depression       MEDICATIONS:   Current Outpatient Medications   Medication Sig Dispense Refill     albuterol (VENTOLIN HFA) 108 (90 Base) MCG/ACT inhaler INHALE 2 PUFFS BY MOUTH FOUR TIMES DAILY AS NEEDED FOR COUGH OR WHEEZING OR SHORTNESS OF BREATH       mometasone-formoterol (DULERA) 200-5 MCG/ACT inhaler Inhale 2 puffs into the lungs       polyethylene glycol (MIRALAX) 17 g packet as needed.       tiotropium (SPIRIVA RESPIMAT) 2.5 MCG/ACT inhaler Inhale 2 puffs into the lungs       albuterol (PROAIR HFA/PROVENTIL HFA/VENTOLIN HFA) 108 (90 Base) MCG/ACT inhaler INHALE TWO PUFFS BY MOUTH FOUR TIMES DAILY AS NEEDED FOR cough/wheeze/SHORTNESS OF BREATH       DULERA 200-5 MCG/ACT inhaler INHALE 2 PUFFS BY MOUTH TWICE DAILY       ibuprofen (ADVIL/MOTRIN) 200 MG tablet Take 400 mg by mouth       nicotine (NICODERM CQ) 21 MG/24HR 24 hr patch as needed.       SPIRIVA RESPIMAT 2.5 MCG/ACT inhaler Inhale 2 puffs daily.         ALLERGIES:   Allergies   Allergen Reactions     Amoxicillin Rash     Patient notes she had previously tolerated this until recently when she was prescribed a higher dose       PHYSICAL EXAM:  There were no vitals taken for this visit.   GENERAL: Healthy, alert and no distress  EYES: Eyes grossly normal to inspection.  No discharge or erythema, or obvious scleral/conjunctival abnormalities.  RESP: No audible wheeze, cough, or visible cyanosis.  No visible retractions or increased work of breathing.    SKIN: Visible skin clear. No significant rash, abnormal pigmentation or lesions.  NEURO: Cranial nerves grossly intact.  Mentation and speech appropriate for age.  PSYCH: Mentation appears normal,  affect normal/bright, judgement and insight intact, normal speech and appearance well-groomed.    Location of obesity: Central Obesity    ASSESSMENT:  Rosa is a patient with mature onset  obesity with significant element of familial/genetic influence and with current health consequences. She does need aggressive weight loss plan due to morbid obesity, health comorbidities.  Rosa Arreguin eats a high carb diet, eats a high fat diet, uses food as a reward and has perception of intense hunger.    Her problem is complicated by a hunger disorder, strong craving/reward pathways and poor lifestyle choices      PLAN:    Diet:   - reduce calories and track: aim for <1500 Kcal/day  - improve quality, avoiding pop  - try IF 16/8 some nights of the week      Medication:  The patient will begin medication in pursuit of improved medical status as influenced by body weight. She will start phentermine and topiramate. Patient was made aware that topiramate is not approved for the treatment of obesity.  There is a mutual understanding of the goals and risks of this therapy. The patient is in agreement. She is educated on dosage regimen and possible side effects.      RTC:    4 weeks with [facility] pharmacy, 8 weeks with me  Both in medical weight management clinic.     TIME: 45 min spent on evaluation, management, counseling, education, & motivational interviewing with greater than 50 % of the total time was spent on counseling and coordinating care    Sincerely,    Tab Johnson MD

## 2020-09-01 NOTE — TELEPHONE ENCOUNTER
Central Prior Authorization Team   Phone: [phone]      PA Initiation    Medication: phentermine (ADIPEX-P) 15 MG capsule  Insurance Company: [employer] - Phone [phone] Fax [phone]  Pharmacy Filling the Rx: [id number] [place], [place] - [address]  Filling Pharmacy Phone: [phone]  Filling Pharmacy Fax:    Start Date: 9/1/2020

## 2020-09-01 NOTE — PATIENT INSTRUCTIONS
MEDICATION STARTED AT THIS APPOINTMENT    We are starting Phentermine. Take one tablet in the morning.  Call the nurse at [phone] if you have any questions or concerns. (Do not stop taking it if you don't think it's working. For some people it works without them knowing it.)    Phentermine is being prescribed because you identified hunger as one of the main causes for your extra weight.      Our patients on Phentermine find that they:    >feel less hunger    >find it easier to push the plate away   >have an easier time eating less    For some of our patients, these feelings are very real and immediate. For other patients, the feelings are less obvious. They don't feel much of a change but find they've lost weight. Like all weight loss medications, Phentermine  works best when you help it work. This means:  1. Having less tempting high calorie (fattening) food around the house or office. (For people with strong cravings this is very important.)   2. Staying away from situations or people that may trigger your cravings .   3. Eating out only one time or less each week.  4. Eating your meals at a table with the TV or computer off.    Side-effects. Phentermine is generally well tolerated. The main side-effects we see are feelings of racing pulse or rapid heart beat. Some people can get an elevated blood pressure. Because of this we may have you come back within a week or so of starting the medication for a blood pressure check.         In order to get refills of this or any medication we prescribe you must be seen in the medical weight mgmt clinic every 2-3 months. Please have your pharmacy fax a refill request to [phone].      MEDICATION STARTED AT THIS APPOINTMENT  We are starting topiramate at bedtime.  Start one tab, 25 mg, for a week. Go up to 50 mg (2 tabs) for the next week. At the third week, take   3 tabs (75 mg).  Stay at 3 tabs until you are seen again. Call the nurse at [phone] if you have  any questions or concerns. (Do not stop taking it if you don't think it's working. For some people it works even though they do not feel much different.)    Topiramate (Topamax) is a medication that is used most often to treat migraine headaches or for seizures. It has also been found to help with weight loss. Although it's not currently FDA approved for weight loss, it has been used safely for a number of years to help people who are carrying extra weight.     Just how topiramate helps with weight loss has not been exactly determined. However it seems to work on areas of the brain to quiet down signals related to eating.      Topiramate may make you:    >feel less interest in eating in between meals   >think less about food and eating   >find it easier to push the plate away   >find giving up pop easier    >have an easier time eating less    For some of our patients, the pills work right away. They feel and think quite differently about food. Other patients don't feel much of a change but find in fact they have lost weight! Like all weight loss medications, topiramate works best when you help it work.  This means:    1) Have less tempting high calorie (fattening) food around the house or office    2) Have lower calorie food (fruits, vegetables,low fat meats and dairy) for snacks    3) Eat out only one time or less each week.   4) Eat your meals at a table with the TV or computer off.    Side-effects. Topiramate is generally well tolerated. The main side-effects we see are:   Tingling in hands,feet, or face (usually not very troublesome)   Mental confusion and word finding trouble (about 10% of patients have this.)     Feeling sleepy or a bit dopey- this goes away very soon after starting.    One of the dangers of topiramate is the possibility of birth defects--if you get pregnant when you are on it, there is the risk that your baby will be born with a cleft lip or palate.  If you are on topiramate and of child  bearing age, you need to be on a reliable form of birth control or refrain from sexual intercourse.     Please refer to the pharmacy insert for more information on side-effects. Since many pharmacists are not familiar with the use of topiramate in weight loss, calling the clinic will get you the most accurate information on the use of this medication for weight loss.     In order to get refills of this or any medication we prescribe you must be seen in the medical weight mgmt clinic every 2-3 months. Please have your pharmacy fax a refill request to [phone].

## 2020-09-02 NOTE — TELEPHONE ENCOUNTER
PRIOR AUTHORIZATION DENIED    Medication: phentermine (ADIPEX-P) 15 MG capsule-PA Denied    Denial Date: 9/1/2020    Denial Rational: All weight loss medications are excluded        Appeal Information:

## 2020-09-03 ENCOUNTER — TELEPHONE (OUTPATIENT)
Dept: ENDOCRINOLOGY | Facility: CLINIC | Age: 55
End: 2020-09-03

## 2020-09-03 NOTE — TELEPHONE ENCOUNTER
MTM referral from: [facility] visit (referral by provider)    MTM referral outreach attempt #2 on September 3, 2020 at 2:56 PM      Outcome: Patient not reachable after several attempts, will route to MTM Pharmacist/Provider as an FYI. Thank you for the referral.    Stephanie Young, MTM Coordinator

## 2020-09-04 ENCOUNTER — RECORDS - HEALTHEAST (OUTPATIENT)
Dept: LAB | Facility: CLINIC | Age: 55
End: 2020-09-04

## 2020-09-04 LAB
ALBUMIN SERPL-MCNC: 4.2 G/DL (ref 3.5–5)
ALP SERPL-CCNC: 116 U/L (ref 45–120)
ALT SERPL W P-5'-P-CCNC: 50 U/L (ref 0–45)
ANION GAP SERPL CALCULATED.3IONS-SCNC: 11 MMOL/L (ref 5–18)
AST SERPL W P-5'-P-CCNC: 41 U/L (ref 0–40)
BILIRUB SERPL-MCNC: 0.5 MG/DL (ref 0–1)
BUN SERPL-MCNC: 12 MG/DL (ref 8–22)
CALCIUM SERPL-MCNC: 9.7 MG/DL (ref 8.5–10.5)
CHLORIDE BLD-SCNC: 100 MMOL/L (ref 98–107)
CO2 SERPL-SCNC: 27 MMOL/L (ref 22–31)
CREAT SERPL-MCNC: 0.77 MG/DL (ref 0.6–1.1)
GFR SERPL CREATININE-BSD FRML MDRD: >60 ML/MIN/1.73M2
GLUCOSE BLD-MCNC: 117 MG/DL (ref 70–125)
POTASSIUM BLD-SCNC: 4 MMOL/L (ref 3.5–5)
PROT SERPL-MCNC: 7.9 G/DL (ref 6–8)
SODIUM SERPL-SCNC: 138 MMOL/L (ref 136–145)
TSH SERPL DL<=0.005 MIU/L-ACNC: 1.57 UIU/ML (ref 0.3–5)

## 2020-09-30 ENCOUNTER — ALLIED HEALTH/NURSE VISIT (OUTPATIENT)
Dept: PHARMACY | Facility: CLINIC | Age: 55
End: 2020-09-30
Payer: COMMERCIAL

## 2020-09-30 DIAGNOSIS — G25.81 RESTLESS LEG SYNDROME: ICD-10-CM

## 2020-09-30 DIAGNOSIS — E66.813 CLASS 3 SEVERE OBESITY DUE TO EXCESS CALORIES WITH SERIOUS COMORBIDITY AND BODY MASS INDEX (BMI) OF 40.0 TO 44.9 IN ADULT (H): Primary | ICD-10-CM

## 2020-09-30 DIAGNOSIS — I10 BENIGN ESSENTIAL HYPERTENSION: ICD-10-CM

## 2020-09-30 DIAGNOSIS — E11.9 TYPE 2 DIABETES MELLITUS WITHOUT COMPLICATION, WITHOUT LONG-TERM CURRENT USE OF INSULIN (H): ICD-10-CM

## 2020-09-30 DIAGNOSIS — J44.9 CHRONIC OBSTRUCTIVE PULMONARY DISEASE, UNSPECIFIED COPD TYPE (H): ICD-10-CM

## 2020-09-30 DIAGNOSIS — K59.00 CONSTIPATION, UNSPECIFIED CONSTIPATION TYPE: ICD-10-CM

## 2020-09-30 DIAGNOSIS — E66.01 CLASS 3 SEVERE OBESITY DUE TO EXCESS CALORIES WITH SERIOUS COMORBIDITY AND BODY MASS INDEX (BMI) OF 40.0 TO 44.9 IN ADULT (H): Primary | ICD-10-CM

## 2020-09-30 DIAGNOSIS — J45.909 ASTHMA, UNSPECIFIED ASTHMA SEVERITY, UNSPECIFIED WHETHER COMPLICATED, UNSPECIFIED WHETHER PERSISTENT: ICD-10-CM

## 2020-09-30 PROCEDURE — 99607 MTMS BY PHARM ADDL 15 MIN: CPT | Performed by: PHARMACIST

## 2020-09-30 PROCEDURE — 99605 MTMS BY PHARM NP 15 MIN: CPT | Performed by: PHARMACIST

## 2020-09-30 RX ORDER — LOSARTAN POTASSIUM 25 MG/1
1 TABLET ORAL DAILY
COMMUNITY
Start: 2020-09-28 | End: 2021-11-17

## 2020-09-30 RX ORDER — GABAPENTIN 300 MG/1
300-600 CAPSULE ORAL AT BEDTIME
COMMUNITY
Start: 2020-09-24

## 2020-09-30 NOTE — PROGRESS NOTES
MTM ENCOUNTER  SUBJECTIVE/OBJECTIVE:                           Rosa Arreguin is a 55 year old female called for an initial visit. She was referred to me from Dr. Johnson.      Patient consented to a telehealth visit: yes  Telemedicine Visit Details  Type of service:  Telephone visit  Start Time: 4:48 PM Was able to connect with patient on second attempt in calling her.   End Time: 5:30 PM  Originating Location (pt. Location): Home  Distant Location (provider location):  [facility] [place]  Mode of Communication:  Telephone    Chief Complaint: comprehensive review of medications - patient wanted to discuss weight loss medications, blood pressure and new diabetes diagnosis.    Allergies/ADRs: Reviewed in chart  Tobacco: Quit smoking in January   Alcohol: Less than 1 beverages / week  Caffeine: 1 cups/day of coffee, stopped soda drinking.   Activity: See below.   PMH: Reviewed in chart    Medication Adherence/Access: No issues reported.     Obesity:   Phentermine 15 mg once daily  Topiramate 75 mg once daily     Followed by Dr. Tab Johnson, seen 9/01/2020 for New Medical Weight Management. Patient is experiencing the follow side effects: taste perversion, dry mouth, elevated blood pressure (?). Reports she has noticed increased BP with starting phentermine, went to PCP due to this and blood pressure medications switched around. Reports she has been at 237 lb for ~10 days. She was eating smaller portions prior to starting weight loss medications, but notices it has been easier to do this when started phentermine. She is worried that phentermine causing high blood pressure, see below for more information.  Diet/Eating Habits: Patient reports she has a great deal of sugar cravings that have been diminished by topirmate. She easts 3 times per day but will snack between meals, not as frequently since starting topiramate. She reports that she quit drinking regular soda (2 liters regular soda). She reports that she is  surprised she doesn't miss drinking soda. Switched to water.   Exercise/Activity: Patient reports walks on treadmill 20 minutes daily.     Current weight today: 237 lb   Initial Consult Weight : 252 lb    Cumulative Weight Loss: -15 lb     Care Everywhere Vitals 9/24/2020  Height: 62.5 inches  BMI: 43.6 kg/m^2    CareEverywhere Labs 9/4/2020   Alk Phos 116  AST 41  ALT 50     Type 2 Diabetes:    Metformin 500 mg BID - recent start    New diagnosis of type 2 diabetes from primary care over the last month. Was started on metformin after lab results on 9/4/2020. Reports that prior to this information she has made changes to nutrition (see above), most pertinent to blood sugar history is stopping soda that she was previously drinking 2 L regular soda/day. Less sweets. If eating sweets now, will be fresh fruit. Pt is not experiencing side effects. She would prefer not to start testing blood sugars as of now.   Blood sugar monitoring: never.   Symptoms of low blood sugar? none  Symptoms of high blood sugar? none  Eye exam: up to date  Foot exam: up to date  Aspirin: Not taking due to new diagnosis.   Statin: No - unable to calculate 10 year ASCVD risk as Lipid panel > 1 year ago  ACEi/ARB: Yes: losartan.   Urine Albumin: No results found for: UMALCR     CareEverywhere Labs 9/4/2020   Hemoglobin A1c 6.8%    Hypertension:   Losartan 25 mg once daily - recent start     Many changes to blood pressure medications over the last month - Hydrochlorothiazide patient reports for breathing, not for high blood pressure. Hydrochlorothiazide changed to lisinopril (unclear indication), then lisinopril switched to losartan due to cough from lisinopril. Patient does self-monitor BP. Patient reports no current medication side effects. Prior to starting phentermine, reports blood pressure was 120s/70s mmHg. Then reports after starting phentermine then went to 140s/90s. She has not tested blood pressure since changing to losartan, has been  on losartan for 3-4 days. Following up with PCP in 3 weeks for BP recheck.   BP Readings from Last 3 Encounters:   No data found for BP     Care Everywhere Vitals 9/24/2020  Blood pressure: 144/98 mmHg     Care Everywhere Labs 9/4/2020   Creatinine 0.77  GFR >60   Potassium 4    Asthma/COPD:   ICS/LABA- Dulera 2 puff(s) twice daily  Spiriva 2 puffs daily.    Reports that she was put on hydrochlorothiazide 25 mg daily over a year ago by Pulmonology for breathing and found that it helped with breathing.She is worried that stopping hydrochlorothiazide will cause breathing issues, but thus far no issues. Previously describes she quit hydrochlorothiazide at one point when quit smoking in January, but 6 months after noticed breathing wasn't better so restarted hydrochlorothiazide and within days breathing improved. Reports currently no SOB at rest. No wheezing. No chest pain or tightness. Reports potentially more phlegm she noticed today but this is new. Hx bronchitis ~ 2 times per year.  Pt rinses their mouth after using steroid inhaler. Asthma triggers include: humidity and weight gain.  Pt reports the following symptoms: none. Followed by Pulmonology, last seen 8/29/2019. Hx trying multiple inhalers prior to this regimen. Up until recently had been doing well on solely utilizing albuterol PRN, but over the years breathing has gotten worse, particular with weight gain.   Asthma Action Plan on file: NO    Restless Leg:   Gabapentin 300 mg daily nightly     Recently started for restless legs that have been worsening over the last several months. Finds that 300 mg nightly is working well, but was told she could go up to 900 mg nightly if needed. No ferritin level checked recently, but does report red meat (ground beef) is a staple in their family.    CareEverywhere Labs 9/4/2020   Hemoglobin 14.2     Constipation:   Miralax 1 capful PRN    Uses as needed. She tends to be more constipated consistently. Finds that since  starting metformin that she isn't having a much of issues with this. No diarrhea. Drinking more water, now 48+oz daily.     ASSESSMENT:                              Medication Adherence: No issues identified    Obesity: Weight loss progressing. Needs improvement - as phentermine may be exacerbating blood pressure, could consider lowering phentermine dose or stopping and continue solely on topiramate. Also discussed potential for addition of GLP1 agonist due to diabetes diagnosis, but patient preferred to hold off for now. Patient prefers to continue phentermine for now. See hypertension section for more information.     Type 2 Diabetes:  Blood sugars stable- A1c at goal <7% prior to metformin start. Discussed that component leading to diabetes diagnosis is related to dietary means. Would benefit from continuing to making nutritional changes and continuing with weight loss. Although metformin is first line for Type 2 diabetes, patient already at goal so could consider utilizing other options such as GLP1 agonist for weight loss benefits. Patient preferred to continue with metformin for now.  May benefit from at least moderate intensity statin therapy, but would benefit from lipid panel repeat to identify ASCVD risk to ensure high intensity statin therapy in not necessary. Due to diagnosis of diabetes, age >50, hypertension and recent smoking history with no anemia or renal disease, could benefit from low dose aspirin therapy in the future but did not have time to discuss today. Due for albumin/creatinine check in future.     Hypertension: Unknown current status. Patient would benefit from getting blood pressure rechecked both at home and at next follow up with PCP to ensure within goal <140/90 mmHg. Do not like the idea of prescribing cascade (utilizing blood pressure medication to counter side effect from phentermine, but unclear true status of blood pressure prior to phentermine). Patient prefers to no changes to  medications at this time. Losartan has some utilization with new diabetes diagnosis if found to have albumin/creatinine issues (unknown at this point). hydrochlorothiazide although could minimally cause some sugar imbalances historically has shown improvements with breathing. Therefore, will await current blood pressure status to make any considerations, defer to PCP for now as following up soon.     Asthma/COPD: Stable for now. As appears to be a strong correlation between hydrochlorothiazide and breathing status improvements, so Patient would benefit from following up with PCP if breathing worsens since stopping hydrochlorothiazide. ACT/CAT unable to be completed today but would benefit from in future.     Restless Leg: Stable. Although Hgb normal, could consider obtaining ferritin to ensure restless leg isn't related to low iron stores.     Constipation: Stable.     PLAN:                            As patient wished to continue on current medication regimen for now, see below for plan:     1. Patient to follow up with PCP regarding blood pressure. Check blood pressure at home. If blood pressure is >140 systolic and/or >90 diastolic then should consider lowering dose of phentermine or holding as do not want to lead to prescribing cascade if can help it.     2. Could consider statin in future, but should get lipid panel in near future to determine intensity of statin to consider. Patient to follow up with PCP regarding this.     3. Could consider low dose aspirin in future, but did not have time to discuss today.     4. If breathing worsens with being off hydrochlorothiazide, follow up with PCP     5. Patient to follow up with PCP about BMP, ferritin and albumin/creatinine ratio labs in future    6. ACT/CAT in future.     Future: did not have time to assess if still staking nicotine patch due to time constraints so will discuss at follow up. Metformin versus GLP1 agonist?     I spent 42 minutes with this patient  today. A copy of the visit note was provided to the patient's referring provider.    Will follow up in 1 month.    The patient declined a summary of these recommendations.     Lauren Bloch, PharmD  Medication Therapy Management Pharmacist   [facility]   Phone: [phone]

## 2020-09-30 NOTE — Clinical Note
Gary Johnson - this was a complicated visit. Patient has new diabetes diagnosis. Potentially elevated blood pressure from phentermine but unclear. Provider switched hydrochlorothiazide --> lisinopril then --> losartan over the last month, but hydrochlorothiazide was being used for her asthma/COPD per her reports. She is getting BP repeat in 3 weeks with provider, discussed if BP remains elevated may want to consider dose decrease of phentermine or stop and talked about potential for GLP1 agonist start (oral or injectable), but PCP recently started metformin (A1c 6.8% prior to metformin start), so patient preferred not to at this time which makes sense. Just a lot of moving parts so really told her what I wanted to follow up with her on in 1 month to reasess. Phen/top working well for weight loss, so decrease phen dose is s/e related. Corinne QUICK

## 2020-11-02 ENCOUNTER — VIRTUAL VISIT (OUTPATIENT)
Dept: ENDOCRINOLOGY | Facility: CLINIC | Age: 55
End: 2020-11-02
Payer: COMMERCIAL

## 2020-11-02 VITALS — BODY MASS INDEX: 40.57 KG/M2 | HEIGHT: 63 IN | WEIGHT: 229 LBS

## 2020-11-02 DIAGNOSIS — E66.01 MORBID OBESITY (H): Primary | ICD-10-CM

## 2020-11-02 PROCEDURE — 99214 OFFICE O/P EST MOD 30 MIN: CPT | Mod: GT | Performed by: INTERNAL MEDICINE

## 2020-11-02 ASSESSMENT — PAIN SCALES - GENERAL: PAINLEVEL: NO PAIN (0)

## 2020-11-02 ASSESSMENT — MIFFLIN-ST. JEOR: SCORE: 1594.93

## 2020-11-02 NOTE — LETTER
"11/2/2020       RE: Rosa Arreguin  [address] [place] [address]     Dear Colleague,    Thank you for referring your patient, Rosa Arreguin, to the [facility] [place] [facility]. Please see a copy of my visit note below.    Rosa Arreguin is a 55 year old female who is being evaluated via a billable video visit.      The patient has been notified of following:     \"This video visit will be conducted via a call between you and your physician/provider. We have found that certain health care needs can be provided without the need for an in-person physical exam.  This service lets us provide the care you need with a video conversation.  If a prescription is necessary we can send it directly to your pharmacy.  If lab work is needed we can place an order for that and you can then stop by our lab to have the test done at a later time.    Video visits are billed at different rates depending on your insurance coverage.  Please reach out to your insurance provider with any questions.    If during the course of the call the physician/provider feels a video visit is not appropriate, you will not be charged for this service.\"    Patient has given verbal consent for Video visit? Yes  How would you like to obtain your AVS? MyChart  If you are dropped from the video visit, the video invite should be resent to: Text to cell phone: [phone]  Will anyone else be joining your video visit? No      During this virtual visit the patient is located in [place], patient verifies this as the location during the entirety of this visit.     Video-Visit Details    Type of service:  Video Visit    Video Start Time: 10:40  Video End Time: 11:05    Originating Location (pt. Location): Home    Distant Location (provider location):  [facility]     Platform used for Video Visit: Ian Johnson, " MD      Return Medical Weight Management Visit    PATIENT:  Rosa Arreguin  MRN:         [id number]  :         1965  GEOFF: 20    Dear Clinic, [facility],    I had the pleasure of seeing your patient, Rosa Arreguin.  Full intake/assessment done to determine barriers to weight loss success and develop a treatment plan.  Rosa Arreguin is a 55 year old female interested in treatment of medical problems associated with weight.  Has COPD, asthma, emphysema.     Previous history:   Weight in early adulthood was around 120 lbs. Weight gained started when she stopped using cocaine since having her baby at age 40. Weight in the 220s until covid-19, then stayed more at home, and gained to max of 250 lbs.     Interval history:   Initial visit with me 2020. She started phent/top since.   Her weight is down to 229 lbs today.     Meds:   - Phentermine 15 mg po daily.   - Topiramate 75 mg at bedtime. (until 2 weeks ago)    Her blood pressure has gone up (to 145/90s) since starting the meds.   She stopped the topiramate 2 weeks ago because it changed the taste of all beverages even water.     Current diet:   Starts eating at 10:30-11 am. Starts with fruits.   Lunch: 1 pm salad + meat, or soup and sandwich  Dinner: varies, watching portions, harder to count calories there  Tried to avoid snacking after dinner. But that is the hardest time.     Tracking calories, staying under 1500 kcal/day.  Was writing things down on a journal, but stopped recently.     Weighs herself daily.     Exercise: Walks or treadmill for 20 min a day    Weight Progress:   Initial weight at first visit to [facility]: 250 lbs on 2020  Weight change since last visit 2020 is down 21 lbs  Weight change since baseline visit: down 21 lbs    ROS   10 point ROS was performed and is negative unless specified in HPI      PAST MEDICAL HISTORY:  Patient Active Problem List   Diagnosis     Asthma     Depression       MEDICATIONS:   Current  "Outpatient Medications   Medication Sig Dispense Refill     albuterol (PROAIR HFA/PROVENTIL HFA/VENTOLIN HFA) 108 (90 Base) MCG/ACT inhaler INHALE TWO PUFFS BY MOUTH FOUR TIMES DAILY AS NEEDED FOR cough/wheeze/SHORTNESS OF BREATH       DULERA 200-5 MCG/ACT inhaler INHALE 2 PUFFS BY MOUTH TWICE DAILY       gabapentin (NEURONTIN) 300 MG capsule Take 300-600 mg by mouth At Bedtime       losartan (COZAAR) 25 MG tablet Take 1 tablet by mouth daily       metFORMIN (GLUCOPHAGE) 500 MG tablet Take 500 mg by mouth 2 times daily (with meals)       nicotine (NICODERM CQ) 21 MG/24HR 24 hr patch as needed.       phentermine (ADIPEX-P) 15 MG capsule Take 1 capsule (15 mg) by mouth every morning 30 capsule 3     polyethylene glycol (MIRALAX) 17 g packet as needed.       SPIRIVA RESPIMAT 2.5 MCG/ACT inhaler Inhale 2 puffs daily.       topiramate (TOPAMAX) 25 MG tablet 25 mg at bedtime for 1 week, 50 mg at bedtime for 1 week and 75 mg daily at bedtime thereafter 90 tablet 3       ALLERGIES:   Allergies   Allergen Reactions     Amoxicillin Rash     Patient notes she had previously tolerated this until recently when she was prescribed a higher dose     Lisinopril Cough       PHYSICAL EXAM:  Ht 1.588 m (5' 2.5\")   Wt 103.9 kg (229 lb)   BMI 41.22 kg/m     GENERAL: Healthy, alert and no distress  EYES: Eyes grossly normal to inspection.  No discharge or erythema, or obvious scleral/conjunctival abnormalities.  RESP: No audible wheeze, cough, or visible cyanosis.  No visible retractions or increased work of breathing.    SKIN: Visible skin clear. No significant rash, abnormal pigmentation or lesions.  NEURO: Cranial nerves grossly intact.  Mentation and speech appropriate for age.  PSYCH: Mentation appears normal, affect normal/bright, judgement and insight intact, normal speech and appearance well-groomed.    Location of obesity: Central Obesity    ASSESSMENT:  Rosa is a patient with mature onset  obesity with significant element of " familial/genetic influence and with current health consequences. She does need aggressive weight loss plan due to morbid obesity, health comorbidities.  Rosa Arreguin eats a high carb diet, eats a high fat diet, uses food as a reward and has perception of intense hunger.    Her problem is complicated by a hunger disorder, strong craving/reward pathways and poor lifestyle choices    She has had good success with weight loss in the past 2 months.   Has had issues with meds:   - phentermine: high BP  - topiramate: unfavorable taste of all drinks including water    PLAN:    Meds:   Stop both meds and monitor off    Diet:   - continue reduced calories aim for <1500 Kcal/day  - continue to work on quality, more fruits and veg  - try IF 16/8 some nights of the week - challenge is to avoid evening snacks      RTC:    12 weeks.     TIME: 25 min spent on evaluation, management, counseling, education, & motivational interviewing with greater than 50 % of the total time was spent on counseling and coordinating care    Sincerely,    Tab Johnson MD

## 2020-11-02 NOTE — PROGRESS NOTES
"oRsa Arreguin is a 55 year old female who is being evaluated via a billable video visit.      The patient has been notified of following:     \"This video visit will be conducted via a call between you and your physician/provider. We have found that certain health care needs can be provided without the need for an in-person physical exam.  This service lets us provide the care you need with a video conversation.  If a prescription is necessary we can send it directly to your pharmacy.  If lab work is needed we can place an order for that and you can then stop by our lab to have the test done at a later time.    Video visits are billed at different rates depending on your insurance coverage.  Please reach out to your insurance provider with any questions.    If during the course of the call the physician/provider feels a video visit is not appropriate, you will not be charged for this service.\"    Patient has given verbal consent for Video visit? Yes  How would you like to obtain your AVS? MyChart  If you are dropped from the video visit, the video invite should be resent to: Text to cell phone: [phone]  Will anyone else be joining your video visit? No      During this virtual visit the patient is located in [place], patient verifies this as the location during the entirety of this visit.     Video-Visit Details    Type of service:  Video Visit    Video Start Time: 10:40  Video End Time: 11:05    Originating Location (pt. Location): Home    Distant Location (provider location):  [facility] [place]     Platform used for Video Visit: swabr    Tab Johnson MD      Return Medical Weight Management Visit    PATIENT:  Rosa Arreguin  MRN:         [id number]  :         1965  GEOFF: 20    Dear Clinic, [facility],    I had the pleasure of seeing your patient, Rosa Arreguin.  Full intake/assessment done to determine barriers to weight loss success and develop a " treatment plan.  Rosa Arreguin is a 55 year old female interested in treatment of medical problems associated with weight.  Has COPD, asthma, emphysema.     Previous history:   Weight in early adulthood was around 120 lbs. Weight gained started when she stopped using cocaine since having her baby at age 40. Weight in the 220s until covid-19, then stayed more at home, and gained to max of 250 lbs.     Interval history:   Initial visit with me 9/1/2020. She started phent/top since.   Her weight is down to 229 lbs today.     Meds:   - Phentermine 15 mg po daily.   - Topiramate 75 mg at bedtime. (until 2 weeks ago)    Her blood pressure has gone up (to 145/90s) since starting the meds.   She stopped the topiramate 2 weeks ago because it changed the taste of all beverages even water.     Current diet:   Starts eating at 10:30-11 am. Starts with fruits.   Lunch: 1 pm salad + meat, or soup and sandwich  Dinner: varies, watching portions, harder to count calories there  Tried to avoid snacking after dinner. But that is the hardest time.     Tracking calories, staying under 1500 kcal/day.  Was writing things down on a journal, but stopped recently.     Weighs herself daily.     Exercise: Walks or treadmill for 20 min a day    Weight Progress:   Initial weight at [facility] to [facility]: 250 lbs on 9/1/2020  Weight change since last visit 9/1/2020 is down 21 lbs  Weight change since baseline visit: down 21 lbs    ROS   10 point ROS was performed and is negative unless specified in HPI      PAST MEDICAL HISTORY:  Patient Active Problem List   Diagnosis     Asthma     Depression       MEDICATIONS:   Current Outpatient Medications   Medication Sig Dispense Refill     albuterol (PROAIR HFA/PROVENTIL HFA/VENTOLIN HFA) 108 (90 Base) MCG/ACT inhaler INHALE TWO PUFFS BY MOUTH FOUR TIMES DAILY AS NEEDED FOR cough/wheeze/SHORTNESS OF BREATH       DULERA 200-5 MCG/ACT inhaler INHALE 2 PUFFS BY MOUTH TWICE DAILY       gabapentin (NEURONTIN)  "300 MG capsule Take 300-600 mg by mouth At Bedtime       losartan (COZAAR) 25 MG tablet Take 1 tablet by mouth daily       metFORMIN (GLUCOPHAGE) 500 MG tablet Take 500 mg by mouth 2 times daily (with meals)       nicotine (NICODERM CQ) 21 MG/24HR 24 hr patch as needed.       phentermine (ADIPEX-P) 15 MG capsule Take 1 capsule (15 mg) by mouth every morning 30 capsule 3     polyethylene glycol (MIRALAX) 17 g packet as needed.       SPIRIVA RESPIMAT 2.5 MCG/ACT inhaler Inhale 2 puffs daily.       topiramate (TOPAMAX) 25 MG tablet 25 mg at bedtime for 1 week, 50 mg at bedtime for 1 week and 75 mg daily at bedtime thereafter 90 tablet 3       ALLERGIES:   Allergies   Allergen Reactions     Amoxicillin Rash     Patient notes she had previously tolerated this until recently when she was prescribed a higher dose     Lisinopril Cough       PHYSICAL EXAM:  Ht 1.588 m (5' 2.5\")   Wt 103.9 kg (229 lb)   BMI 41.22 kg/m     GENERAL: Healthy, alert and no distress  EYES: Eyes grossly normal to inspection.  No discharge or erythema, or obvious scleral/conjunctival abnormalities.  RESP: No audible wheeze, cough, or visible cyanosis.  No visible retractions or increased work of breathing.    SKIN: Visible skin clear. No significant rash, abnormal pigmentation or lesions.  NEURO: Cranial nerves grossly intact.  Mentation and speech appropriate for age.  PSYCH: Mentation appears normal, affect normal/bright, judgement and insight intact, normal speech and appearance well-groomed.    Location of obesity: Central Obesity    ASSESSMENT:  Rosa is a patient with mature onset  obesity with significant element of familial/genetic influence and with current health consequences. She does need aggressive weight loss plan due to morbid obesity, health comorbidities.  Rosa Arreguin eats a high carb diet, eats a high fat diet, uses food as a reward and has perception of intense hunger.    Her problem is complicated by a hunger disorder, " strong craving/reward pathways and poor lifestyle choices    She has had good success with weight loss in the past 2 months.   Has had issues with meds:   - phentermine: high BP  - topiramate: unfavorable taste of all drinks including water    PLAN:    Meds:   Stop both meds and monitor off    Diet:   - continue reduced calories aim for <1500 Kcal/day  - continue to work on quality, more fruits and veg  - try IF 16/8 some nights of the week - challenge is to avoid evening snacks      RTC:    12 weeks.     TIME: 25 min spent on evaluation, management, counseling, education, & motivational interviewing with greater than 50 % of the total time was spent on counseling and coordinating care    Sincerely,    Tab Johnson MD

## 2020-11-02 NOTE — NURSING NOTE
"Chief Complaint   Patient presents with     Follow Up     [facility] follow up       Vitals:    11/02/20 1013   Weight: 103.9 kg (229 lb)   Height: 1.588 m (5' 2.5\")       Body mass index is 41.22 kg/m .                              "

## 2020-11-04 ENCOUNTER — VIRTUAL VISIT (OUTPATIENT)
Dept: PHARMACY | Facility: CLINIC | Age: 55
End: 2020-11-04
Payer: COMMERCIAL

## 2020-11-04 DIAGNOSIS — E66.01 CLASS 3 SEVERE OBESITY DUE TO EXCESS CALORIES WITH SERIOUS COMORBIDITY AND BODY MASS INDEX (BMI) OF 40.0 TO 44.9 IN ADULT (H): Primary | ICD-10-CM

## 2020-11-04 DIAGNOSIS — E66.813 CLASS 3 SEVERE OBESITY DUE TO EXCESS CALORIES WITH SERIOUS COMORBIDITY AND BODY MASS INDEX (BMI) OF 40.0 TO 44.9 IN ADULT (H): Primary | ICD-10-CM

## 2020-11-04 DIAGNOSIS — I10 BENIGN ESSENTIAL HYPERTENSION: ICD-10-CM

## 2020-11-04 PROCEDURE — 99606 MTMS BY PHARM EST 15 MIN: CPT | Mod: TEL | Performed by: PHARMACIST

## 2020-11-04 NOTE — PROGRESS NOTES
MTM ENCOUNTER  SUBJECTIVE/OBJECTIVE:                           Rosa Arreguin is a 55 year old female called for a follow-up visit. She was referred to me from Dr. Johnson.  Today's visit is a follow-up MTM visit from 9/30/2020.      Reason for visit: wants to discuss plan for off weight loss medications and follow up about blood pressure. She reports she did not have other items she wished to discuss today.     Allergies/ADRs: Reviewed in chart  Tobacco: She reports that she quit smoking about 9 months ago. She does not have any smokeless tobacco history on file.  Alcohol: not currently using  Caffeine: no caffeine  Past Medical History: Reviewed in chart      Medication Adherence/Access: no issues reported    Obesity:   Phentermine 15 mg once daily  Topiramate 75 mg once daily     Followed by Dr. Tab Johnson, seen 11/2/2020, patient reported at that time blood pressure being elevated despite losartan start with phentermine on board, so plan was to stop phentermine. Patient reports she did continue taking as she wanted to use up what she had at home, but since her blood pressure is still elevated, she is planning on stopping today. Topiramate was causing change of taste even with water so stopped that. No medications were added at that time, plan for patient as of now is to focus on nutrition/activity for now and can consider alternatives for the future. She has been persistently losing weight and making nutrition changes. Weighs self daily. Walking on treadmill 20 minutes daily. She asked about starting ProVen dietary supplement for weight loss.     Current weight 11/2: 229 lb   Initial Consult Weight : 252 lb    Cumulative Weight Loss: -23 lb    Care Everywhere Vitals 9/24/2020  Height: 62.5 inches  BMI: 43.6 kg/m^2    CareEverywhere Labs 9/4/2020   Alk Phos 116  AST 41  ALT 50     Hypertension:   Losartan 25 mg once daily     See previous MTM visit for history of blood pressure medications. She reports that she  thinks higher blood pressure is largely from phentermine and that when she stops phentermine blood pressure will return to normal. Blood pressures at home are 130s-140s systolic and 80-90s diastolic.  She is worried about having too low of blood pressure. No current medication side effects. She is following up with PCP next week for blood pressure check. No dizziness. No lightheadedness.   BP Readings from Last 3 Encounters:   No data found for BP     Care Everywhere Vitals 9/24/2020  Blood pressure: 144/98 mmHg     Care Everywhere Labs 9/4/2020   Creatinine 0.77  GFR >60   Potassium 4    ASSESSMENT:                            Medication Adherence: No issues identified    Obesity: Weight loss progressing. Patient to follow plan in place from visit with Dr. Johnson. Discussed would not recommend starting ProVen due to containing green tea extract which is caffeine containing which can elevated blood pressure.     Hypertension: Unimproved. BP not at goal <140/90 from at home blood pressures. Would benefit from stopping phentermine as previously recommended and follow up with PCP in 1 week.     PLAN:                            1. Stop phentermine as previously recommended.     2. Follow up with primary care provider about blood pressure next week as already planned. Contact sooner if you are having issues of dizziness or lightheadedness.     3. Would not recommend starting ProVen as it contains green tea extract (caffeine) that can elevated blood pressure.     Future: Follow up on items necessary to follow up with from initial visit as patient did not wish to follow up on those today.     I spent 10 minutes with this patient today. A copy of the visit note was provided to the patient's referring provider.    Will follow up in 2-3 months or per patient preference.    The patient was sent via Diversion a summary of these recommendations.     Lauren Bloch, PharmD  Medication Therapy Management Pharmacist   [facility]   Phone: [phone]      Patient consented to a telehealth visit: yes  Telemedicine Visit Details  Type of service:  Telephone visit  Start Time: 11:15 AM  End Time: 11:25 AM  Originating Location (patient location): Home  Distant Location (provider location):  [facility]  Mode of Communication:  Telephone

## 2020-12-22 ENCOUNTER — OFFICE VISIT - HEALTHEAST (OUTPATIENT)
Dept: PULMONOLOGY | Facility: OTHER | Age: 55
End: 2020-12-22

## 2020-12-22 DIAGNOSIS — J44.9 CHRONIC OBSTRUCTIVE PULMONARY DISEASE, UNSPECIFIED COPD TYPE (H): ICD-10-CM

## 2020-12-22 DIAGNOSIS — J45.30 MILD PERSISTENT ASTHMA WITHOUT COMPLICATION: ICD-10-CM

## 2020-12-22 DIAGNOSIS — E66.01 MORBID OBESITY (H): ICD-10-CM

## 2020-12-28 ENCOUNTER — AMBULATORY - HEALTHEAST (OUTPATIENT)
Dept: SURGERY | Facility: CLINIC | Age: 55
End: 2020-12-28

## 2020-12-28 DIAGNOSIS — Z11.59 ENCOUNTER FOR SCREENING FOR OTHER VIRAL DISEASES: ICD-10-CM

## 2020-12-31 ENCOUNTER — ANESTHESIA - HEALTHEAST (OUTPATIENT)
Dept: SURGERY | Facility: CLINIC | Age: 55
End: 2020-12-31

## 2021-01-02 ENCOUNTER — AMBULATORY - HEALTHEAST (OUTPATIENT)
Dept: FAMILY MEDICINE | Facility: CLINIC | Age: 56
End: 2021-01-02

## 2021-01-02 DIAGNOSIS — Z11.59 ENCOUNTER FOR SCREENING FOR OTHER VIRAL DISEASES: ICD-10-CM

## 2021-01-03 LAB
SARS-COV-2 PCR COMMENT: NORMAL
SARS-COV-2 RNA SPEC QL NAA+PROBE: NEGATIVE
SARS-COV-2 VIRUS SPECIMEN SOURCE: NORMAL

## 2021-01-04 ENCOUNTER — HEALTH MAINTENANCE LETTER (OUTPATIENT)
Age: 56
End: 2021-01-04

## 2021-01-04 ENCOUNTER — SURGERY - HEALTHEAST (OUTPATIENT)
Dept: SURGERY | Facility: CLINIC | Age: 56
End: 2021-01-04

## 2021-01-04 ENCOUNTER — COMMUNICATION - HEALTHEAST (OUTPATIENT)
Dept: SCHEDULING | Facility: CLINIC | Age: 56
End: 2021-01-04

## 2021-01-04 ASSESSMENT — MIFFLIN-ST. JEOR: SCORE: 1582.9

## 2021-01-05 ASSESSMENT — MIFFLIN-ST. JEOR: SCORE: 1598.32

## 2021-01-18 ENCOUNTER — RECORDS - HEALTHEAST (OUTPATIENT)
Dept: LAB | Facility: CLINIC | Age: 56
End: 2021-01-18

## 2021-01-18 LAB
CHOLEST SERPL-MCNC: 202 MG/DL
FASTING STATUS PATIENT QL REPORTED: NO
HDLC SERPL-MCNC: 50 MG/DL
LDLC SERPL CALC-MCNC: 124 MG/DL
TRIGL SERPL-MCNC: 142 MG/DL

## 2021-02-02 ENCOUNTER — AMBULATORY - HEALTHEAST (OUTPATIENT)
Dept: PULMONOLOGY | Facility: OTHER | Age: 56
End: 2021-02-02

## 2021-02-02 DIAGNOSIS — J44.89 CHRONIC OBSTRUCTIVE AIRWAY DISEASE WITH ASTHMA (H): ICD-10-CM

## 2021-03-03 ENCOUNTER — COMMUNICATION - HEALTHEAST (OUTPATIENT)
Dept: PULMONOLOGY | Facility: OTHER | Age: 56
End: 2021-03-03

## 2021-03-03 DIAGNOSIS — J45.909 ASTHMA: ICD-10-CM

## 2021-03-05 ENCOUNTER — OFFICE VISIT - HEALTHEAST (OUTPATIENT)
Dept: PULMONOLOGY | Facility: OTHER | Age: 56
End: 2021-03-05

## 2021-03-05 DIAGNOSIS — F17.201 TOBACCO ABUSE, IN REMISSION: ICD-10-CM

## 2021-03-19 ENCOUNTER — HOSPITAL ENCOUNTER (OUTPATIENT)
Dept: CT IMAGING | Facility: HOSPITAL | Age: 56
Discharge: HOME | End: 2021-03-19
Attending: INTERNAL MEDICINE

## 2021-03-19 DIAGNOSIS — F17.201 TOBACCO ABUSE, IN REMISSION: ICD-10-CM

## 2021-05-01 ENCOUNTER — HEALTH MAINTENANCE LETTER (OUTPATIENT)
Age: 56
End: 2021-05-01

## 2021-05-18 NOTE — TELEPHONE ENCOUNTER
Pharmacy has started a Prior Authorization request via Cover My Meds for - Phentermine HCl 15MG capsules, Key: [id number]     Reviewed

## 2021-05-24 ENCOUNTER — RECORDS - HEALTHEAST (OUTPATIENT)
Dept: ADMINISTRATIVE | Facility: CLINIC | Age: 56
End: 2021-05-24

## 2021-05-28 ASSESSMENT — ASTHMA QUESTIONNAIRES: ACT_TOTALSCORE: 21

## 2021-05-30 NOTE — TELEPHONE ENCOUNTER
Called patient to discuss Breo request. Per patient, her insurance will not cover Breo or fluticasone-salmeterol. Discussed with Dr. Mar, patient will be trialed on Dulera. Ordered one month with one refill, and had patient schedule office visit with Katina.

## 2021-05-31 VITALS — BODY MASS INDEX: 40.04 KG/M2 | WEIGHT: 234.5 LBS | HEIGHT: 64 IN

## 2021-05-31 VITALS — WEIGHT: 240 LBS | BODY MASS INDEX: 41.2 KG/M2

## 2021-05-31 NOTE — PROGRESS NOTES
CCx: Follow-up visit for asthma    HPI:   Rosa Arreguin is a 52-year-old female who presents to clinic the aforementioned chief complaint.  Over the course of the year based on her insurance coverage and symptom control she has been using Dulera twice a day and Spiriva daily.  Unfortunately she continues to smoke half a pack of cigarettes a day and endorses shortness of breath at rest and dyspnea on exertion while walking 200 yards.  She has no chest pain, chest tightness, coughing, fevers, chills but does occasionally have night sweats and attributes these to menopausal symptoms.  Her CAT today is 3+1+0+5+1+1+1+4 = 16    From prior visit:  She had been diagnosed with asthma approximately 20 years ago, and COPD approximately 15 years ago.  For quite some time, she was using only an albuterol inhaler and was doing quite well.  However because of worsening symptoms, patient was tried on several inhalers including Advair and Symbicort.  She does not remember why she stopped taking Advair, she says she had a reaction to the Symbicort and stopped taking that.  Currently, she is taking Flovent, Spiriva, duo nebs, and albuterol inhaler.  She has had trouble going up steps for years.  States the past 3 months is gotten especially worse to the point that she has to take a break when she gets to the top of stairs.. She does not need any help with any of her activities of daily living.  However, she is found it harder to keep it with her job as a  subsequently taken several days off of work.  She usually comes down with a cold or bronchitis twice a year receives steroids during those times.  Humidity and bending over may seem to make her breathing worse.  She otherwise denies any other specific alleviating or aggravating factors.    Inhaler Regimen: flovent, spiriva, duonebs, albuterol inhaler: Tried in past: symbicort (reaction), advair    Work History: , [employer]  Hobbies: golfing  Pets: none  Tobacco  Use: stopped 8/3, 40 years 1/2 ppd   [place] Environment[place] and boyfriend    ROS:  Pertinent positives alluded to in the HPI.  Review of systems is negative.    PMH (unchanged from prior visit):  Abnormal Pap smear  Asthma  Tobacco abuse    PSH (unchanged from prior visit):  No past surgeries    SH (unchanged from prior visit):  Social History     Socioeconomic History     Marital status: Single     Spouse name: Not on file     Number of children: Not on file     Years of education: Not on file     Highest education level: Not on file   Occupational History     Not on file   Social Needs     Financial resource strain: Not on file     Food insecurity:     Worry: Not on file     Inability: Not on file     Transportation needs:     Medical: Not on file     Non-medical: Not on file   Tobacco Use     Smoking status: Current Every Day Smoker     Packs/day: 0.25     Years: 40.00     Pack years: 10.00     Last attempt to quit: 8/3/2017     Years since quittin.0     Smokeless tobacco: Never Used     Tobacco comment: 1-2 per day   Substance and Sexual Activity     Alcohol use: Not on file     Drug use: Not on file     Sexual activity: Not on file   Lifestyle     Physical activity:     Days per week: Not on file     Minutes per session: Not on file     Stress: Not on file   Relationships     Social connections:     Talks on phone: Not on file     Gets together: Not on file     Attends Church service: Not on file     Active member of club or organization: Not on file     Attends meetings of clubs or organizations: Not on file     Relationship status: Not on file     Intimate partner violence:     Fear of current or ex partner: Not on file     Emotionally abused: Not on file     Physically abused: Not on file     Forced sexual activity: Not on file   Other Topics Concern     Not on file   Social History Narrative     Not on file       Family history (unchanged from prior visit):  Mother  COPD      Current Meds:  Current Outpatient Medications   Medication Sig     ibuprofen (ADVIL,MOTRIN) 200 MG tablet Take 400 mg by mouth every 6 (six) hours as needed for pain.     ipratropium-albuterol (DUO-NEB) 0.5-2.5 mg/3 mL nebulizer Take 3 mL by nebulization every 4 (four) hours as needed.     mometasone-formoterol (DULERA) 200-5 mcg/actuation HFAA inhaler Inhale 2 puffs 2 (two) times a day.     tiotropium bromide (SPIRIVA RESPIMAT) 2.5 mcg/actuation Mist Inhale 2 puffs daily.     VENTOLIN HFA 90 mcg/actuation inhaler INHALE 2 PUFFS BY MOUTH FOUR TIMES DAILY AS NEEDED FOR COUGH OR WHEEZING OR SHORTNESS OF BREATH     umeclidinium (INCRUSE ELLIPTA) 62.5 mcg/actuation DsDv inhaler Inhale 1 puff daily.       Labs (unchanged from prior visit):  No results found for this or any previous visit (from the past 72 hour(s)).    I have personally reviewed all imaging and PFT data available pertinent to this visit.    Imaging studies (unchanged from prior visit):  CTA CHEST PE RUN  8/4/2017 11:18 AM     INDICATION: Shortness of breath  TECHNIQUE: Helical acquisition through the chest was performed during the arterial phase of contrast enhancement using IV contrast. 2D and 3D reconstructions were performed by the CT technologist. Dose reduction techniques were used.   IV CONTRAST: Iohexol (Omni) 100 mL  COMPARISON: None.     FINDINGS:  ANGIOGRAM CHEST: Negative for pulmonary emboli. Negative for thoracic aortic dissection and aneurysms.     LUNGS AND PLEURA: There is mild atelectasis in the inferior lingula. Lungs otherwise clear. No pulmonary nodules or masses. No pneumothorax or pleural effusion. No focal airspace pneumonia.     MEDIASTINUM: No mediastinal or hilar lymphadenopathy. There are small aorticopulmonary window lymph nodes. No pericardial effusion. No coronary artery calcification. There is no axillary lymphadenopathy. The visualized thyroid is normal.     LIMITED UPPER ABDOMEN: There is an ovoid low-attenuation  mass in the right adrenal gland measuring 1.8 x 0.8 cm. Given its attenuation this is most likely a benign adenoma.     MUSCULOSKELETAL: Negative.     IMPRESSION:   CONCLUSION:  1.  Pulmonary arteries are well-opacified and there is no evidence for pulmonary and was in either lung. No thoracic aortic aneurysm or thoracic aortic dissection.  2.  Atelectasis in the inferior lingula. Lungs are otherwise clear.  3.  Probable benign adenoma right adrenal gland.    PFTs (unchanged from prior visit):  FEV1/FVC is 48% and is reduced.  FEV1 is 40% predicted and is reduced.  FVC is 62% predicted and reduced.  There was improvement in spirometry after a single inhaled dose of bronchodilator.  TLC is 108% predicted and is normal.  RV is 176% predicted and is reduced.  DLCO is 84% predicted and is normal.    Impression:  Full Pulmonary Function Test is abnormal.  PFTs are consistent with severe  obstructive disease.  Bronchodilator response is consistent with reversibility.  There is no hyperinflation.  There is air-trapping.  Diffusion capacity when corrected for hemoglobin is normal.    Physical Exam:  /82   Pulse 88   Resp 24   Wt (!) 233 lb 6.4 oz (105.9 kg)   LMP 08/04/2017   SpO2 93% Comment: RA  BMI 40.06 kg/m    Physical Exam   Constitutional: She is oriented to person, place, and time. She appears well-developed and well-nourished.   HENT:   Head: Normocephalic and atraumatic.   Eyes: Pupils are equal, round, and reactive to light.   Neck: Normal range of motion.   Cardiovascular: Normal rate and regular rhythm.   Pulmonary/Chest: Effort normal and breath sounds normal. No respiratory distress. She has no wheezes. She has no rales.   Abdominal: Soft.   Musculoskeletal: Normal range of motion. She exhibits no edema.   Neurological: She is alert and oriented to person, place, and time.   Skin: Skin is warm.   Psychiatric: She has a normal mood and affect.       Assessment and Plan:  1. Asthma COPD overlap  syndrome: He is compliant with both Dulera and Spiriva and continues to have shortness of breath and unfortunately continues to smoke. Note she did have alpha-1 antitrypsin levels checked at the last visit and these were noted to be normal.    Continue Dulera inhaler twice a day and was reminded to gargle after using her Breo.    Continue Spiriva once a day.    Continue as needed albuterol for rescue.    Reminded her to be more physically active on a routine basis.    Elevate head end of her bed when she sleeps.    We talked at length about tobacco cessation given the this is likely leading to her ongoing shortness of breath.  2. HCM:     Strongly urged to quit smoking.      Qualifies for low-dose lung cancer screening.  We will discuss this at her next clinic visit.  3. Follow-up: 6 months.    Fide Ambriz  Pulmonary and Critical Care  [id number]

## 2021-06-01 VITALS — WEIGHT: 249.1 LBS | BODY MASS INDEX: 42.76 KG/M2

## 2021-06-01 VITALS — BODY MASS INDEX: 40.39 KG/M2 | WEIGHT: 235.3 LBS

## 2021-06-01 VITALS — WEIGHT: 239.1 LBS | BODY MASS INDEX: 41.04 KG/M2

## 2021-06-03 VITALS — BODY MASS INDEX: 40.06 KG/M2 | WEIGHT: 233.4 LBS

## 2021-06-03 NOTE — PROGRESS NOTES
Call from patient asking for renewal of her handicapped parking permit.  States it was initially signed by one of our doctors, cannot remember who.  Is not .    Per Dr. Ambriz: patient does not appear to qualify for handicapped parking permit. She can discuss with patient at next follow up visit.

## 2021-06-04 VITALS — WEIGHT: 247 LBS | BODY MASS INDEX: 43.77 KG/M2 | HEIGHT: 63 IN

## 2021-06-05 VITALS — BODY MASS INDEX: 40.59 KG/M2 | HEIGHT: 63 IN | WEIGHT: 229.1 LBS

## 2021-06-05 VITALS
SYSTOLIC BLOOD PRESSURE: 110 MMHG | HEART RATE: 88 BPM | DIASTOLIC BLOOD PRESSURE: 80 MMHG | BODY MASS INDEX: 41.43 KG/M2 | OXYGEN SATURATION: 95 % | WEIGHT: 233.9 LBS

## 2021-06-09 NOTE — PROGRESS NOTES
"Rosa Arreguin is a 55 y.o. female who is being evaluated via a billable video visit.      The patient has been notified of following:     \"This video visit will be conducted via a call between you and your physician/provider. We have found that certain health care needs can be provided without the need for an in-person physical exam.  This service lets us provide the care you need with a video conversation.  If a prescription is necessary we can send it directly to your pharmacy.  If lab work is needed we can place an order for that and you can then stop by our lab to have the test done at a later time.    Video visits are billed at different rates depending on your insurance coverage. Please reach out to your insurance provider with any questions.    If during the course of the call the physician/provider feels a video visit is not appropriate, you will not be charged for this service.\"    Patient has given verbal consent to a Video visit? Yes    Will anyone else be joining your video visit? No     What phone number would you like to be contacted at? [phone]    Patient would like to receive their AVS by AVS Preference: Mail a copy.          Video Start Time: 0900    Additional provider notes:   CCx: Follow-up visit for asthma    HPI:   Rosa Arreguin is a 52-year-old female who presents to clinic the aforementioned chief complaint.  Over the course of the year based on her insurance coverage and symptom control she has been using Dulera twice a day and Spiriva daily.  She is still smoking although states that she has cut down some.  She has no chest pain, chest tightness, coughing, fevers, chills but does occasionally have night sweats and attributes these to menopausal symptoms.  COPD Assessment Test  How often do you cough?: 4  How much phlegm (mucous) do you have in your chest?: 2  How tight does your chest feel?: 1  How breathless are you after climbing a hill or flight of stairs?: 5  How limited are " your activities at home?: 5  How confident are you leaving home despite your lung condition?: 0  How soundly do you sleep?: 0  How much energy do you have?: 4  Total Score: 21      From prior visit:  She had been diagnosed with asthma approximately 20 years ago, and COPD approximately 15 years ago.  For quite some time, she was using only an albuterol inhaler and was doing quite well.  However because of worsening symptoms, patient was tried on several inhalers including Advair and Symbicort.  She does not remember why she stopped taking Advair, she says she had a reaction to the Symbicort and stopped taking that.  Currently, she is taking Flovent, Spiriva, duo nebs, and albuterol inhaler.  She has had trouble going up steps for years.  States the past 3 months is gotten especially worse to the point that she has to take a break when she gets to the top of stairs.. She does not need any help with any of her activities of daily living.  However, she is found it harder to keep it with her job as a  subsequently taken several days off of work.  She usually comes down with a cold or bronchitis twice a year receives steroids during those times.  Humidity and bending over may seem to make her breathing worse.  She otherwise denies any other specific alleviating or aggravating factors.    Inhaler Regimen: flovent, spiriva, duonebs, albuterol inhaler: Tried in past: symbicort (reaction), advair  Work History: , [employer]  Hobbies: golfing  Pets: none  Tobacco Use: stopped 8/3, 40 years 1/2 ppd   Home Environment: [facility] daughter and boyfriend    ROS:  Pertinent positives alluded to in the HPI.  Review of systems is negative.    PMH (unchanged from prior visit):  Abnormal Pap smear  Asthma  Tobacco abuse    PSH (unchanged from prior visit):  No past surgeries    SH (unchanged from prior visit):  Social History     Socioeconomic History     Marital status: Single     Spouse name: Not on file      Number of children: Not on file     Years of education: Not on file     Highest education level: Not on file   Occupational History     Not on file   Social Needs     Financial resource strain: Not on file     Food insecurity     Worry: Not on file     Inability: Not on file     Transportation needs     Medical: Not on file     Non-medical: Not on file   Tobacco Use     Smoking status: Former Smoker     Packs/day: 0.25     Years: 40.00     Pack years: 10.00     Last attempt to quit: 2020     Years since quittin.4     Smokeless tobacco: Never Used     Tobacco comment: 1-2 per day   Substance and Sexual Activity     Alcohol use: Not on file     Drug use: Not on file     Sexual activity: Not on file   Lifestyle     Physical activity     Days per week: Not on file     Minutes per session: Not on file     Stress: Not on file   Relationships     Social connections     Talks on phone: Not on file     Gets together: Not on file     Attends Jehovah's witness service: Not on file     Active member of club or organization: Not on file     Attends meetings of clubs or organizations: Not on file     Relationship status: Not on file     Intimate partner violence     Fear of current or ex partner: Not on file     Emotionally abused: Not on file     Physically abused: Not on file     Forced sexual activity: Not on file   Other Topics Concern     Not on file   Social History Narrative     Not on file       Family history (unchanged from prior visit):  Mother COPD      Current Meds:  Current Outpatient Medications   Medication Sig     ibuprofen (ADVIL,MOTRIN) 200 MG tablet Take 400 mg by mouth every 6 (six) hours as needed for pain.     ipratropium-albuterol (DUO-NEB) 0.5-2.5 mg/3 mL nebulizer Take 3 mL by nebulization every 4 (four) hours as needed.     mometasone-formoterol (DULERA) 200-5 mcg/actuation HFAA inhaler Inhale 2 puffs 2 (two) times a day.     nicotine (NICODERM CQ) 21 mg/24 hr as needed.     polyethylene glycol  (MIRALAX) 17 gram packet as needed.     tiotropium bromide (SPIRIVA RESPIMAT) 2.5 mcg/actuation Mist Inhale 2 puffs daily.     VENTOLIN HFA 90 mcg/actuation inhaler INHALE 2 PUFFS BY MOUTH FOUR TIMES DAILY AS NEEDED FOR COUGH OR WHEEZING OR SHORTNESS OF BREATH     umeclidinium (INCRUSE ELLIPTA) 62.5 mcg/actuation DsDv inhaler Inhale 1 puff daily.       Labs (unchanged from prior visit):  No results found for this or any previous visit (from the past 72 hour(s)).    I have personally reviewed all imaging and PFT data available pertinent to this visit.    Imaging studies (unchanged from prior visit):  CTA CHEST PE RUN  8/4/2017 11:18 AM     INDICATION: Shortness of breath  TECHNIQUE: Helical acquisition through the chest was performed during the arterial phase of contrast enhancement using IV contrast. 2D and 3D reconstructions were performed by the CT technologist. Dose reduction techniques were used.   IV CONTRAST: Iohexol (Omni) 100 mL  COMPARISON: None.     FINDINGS:  ANGIOGRAM CHEST: Negative for pulmonary emboli. Negative for thoracic aortic dissection and aneurysms.     LUNGS AND PLEURA: There is mild atelectasis in the inferior lingula. Lungs otherwise clear. No pulmonary nodules or masses. No pneumothorax or pleural effusion. No focal airspace pneumonia.     MEDIASTINUM: No mediastinal or hilar lymphadenopathy. There are small aorticopulmonary window lymph nodes. No pericardial effusion. No coronary artery calcification. There is no axillary lymphadenopathy. The visualized thyroid is normal.     LIMITED UPPER ABDOMEN: There is an ovoid low-attenuation mass in the right adrenal gland measuring 1.8 x 0.8 cm. Given its attenuation this is most likely a benign adenoma.     MUSCULOSKELETAL: Negative.     IMPRESSION:   CONCLUSION:  1.  Pulmonary arteries are well-opacified and there is no evidence for pulmonary and was in either lung. No thoracic aortic aneurysm or thoracic aortic dissection.  2.  Atelectasis in  "the inferior lingula. Lungs are otherwise clear.  3.  Probable benign adenoma right adrenal gland.    PFTs (unchanged from prior visit):  FEV1/FVC is 48% and is reduced.  FEV1 is 40% predicted and is reduced.  FVC is 62% predicted and reduced.  There was improvement in spirometry after a single inhaled dose of bronchodilator.  TLC is 108% predicted and is normal.  RV is 176% predicted and is reduced.  DLCO is 84% predicted and is normal.    Impression:  Full Pulmonary Function Test is abnormal.  PFTs are consistent with severe  obstructive disease.  Bronchodilator response is consistent with reversibility.  There is no hyperinflation.  There is air-trapping.  Diffusion capacity when corrected for hemoglobin is normal.    Physical Exam:  Ht 5' 3\" (1.6 m)   Wt (!) 247 lb (112 kg) Comment: per pt  LMP 08/04/2017   BMI 43.75 kg/m        Assessment and Plan:  1. Asthma COPD overlap syndrome: She is compliant with both Dulera and Spiriva and continues to have shortness of breath and unfortunately continues to smoke. Note she did have alpha-1 antitrypsin levels checked at the last visit and these were noted to be normal.    Continue Dulera inhaler twice a day and was reminded to gargle after using this.    Continue Spiriva once a day.    Continue as needed albuterol for rescue.    Elevate head end of her bed when she sleeps.    We talked at length about tobacco cessation given the this is likely leading to her ongoing shortness of breath.  2. HCM:     Strongly urged to quit smoking.      Qualifies for low-dose lung cancer screening, however given the current pandemic, we are holding off on these.  3. Follow-up: 6 months.    Fide Alvarezqvi  Pulmonary and Critical Care  9013        Video-Visit Details    Type of service:  Video Visit    Video End Time (time video stopped): 0920  Originating Location (pt. Location): Home    Distant Location (provider location):  [facility]     Platform used for Video Visit: " AmWell

## 2021-06-12 NOTE — PROGRESS NOTES
RESPIRATORY CARE NOTE     Patient Name: Rosa Arreguin  Today's Date: 9/11/2017     Complete PFT done. Pt performed tests with good effort. Albuterol neb given.Test results meet ATS criteria. Results scanned into epic. Pt left in no distress.         RESPIRATORY CARE NOTE     Patient Name: Rosa Arreguin  Today's Date: 9/11/2017     Problem List  Patient Active Problem List   Diagnosis     Depression     Mild Intermittent Asthma                           Sylvie Abrams LRT

## 2021-06-13 NOTE — PROGRESS NOTES
CCx: worsening COPD    HPI:   Rosa Arreguin is a 52-year-old female who presents to clinic because of worsening shortness of breath.  She had been diagnosed with asthma approximately 20 years ago, and COPD approximately 15 years ago.  For quite some time, she was using only an albuterol inhaler and was doing quite well.  However because of worsening symptoms, patient was tried on several inhalers including Advair and Symbicort.  She does not remember why she stopped taking Advair, she says she had a reaction to the Symbicort and stopped taking that.  Currently, she is taking Flovent, Spiriva, duo nebs, and albuterol inhaler.  She has had trouble going up steps for years.  States the past 3 months is gotten especially worse to the point that she has to take a break when she gets to the top of stairs.. She does not need any help with any of her activities of daily living.  However, she is found it harder to keep it with her job as a  subsequently taken several days off of work.  She usually comes down with a cold or bronchitis twice a year receives steroids during those times.  Humidity and bending over may seem to make her breathing worse.  She otherwise denies any other specific alleviating or aggravating factors.    Inhaler Regimen: flovent, spiriva, duonebs, albuterol inhaler: Tried in past: symbicort (reaction), advair    Work History: , [employer]  Hobbies: golfing  Pets: none  Tobacco Use: stopped 8/3, 40 years 1/2 ppd   Home Environment: [facility] daughter and boyfriend    PMH:  Abnormal Pap smear  Asthma  Tobacco abuse    PSH:  No past surgeries    SH:  Social History     Social History     Marital status: Single     Spouse name: N/A     Number of children: N/A     Years of education: N/A     Occupational History     Not on file.     Social History Main Topics     Smoking status: Former Smoker     Packs/day: 0.25     Years: 40.00     Quit date: 8/3/2017     Smokeless tobacco:  Never Used     Alcohol use Not on file     Drug use: Not on file     Sexual activity: Not on file     Other Topics Concern     Not on file     Social History Narrative       Family history:  Mother COPD    ROS:  Review of Systems - History obtained from the patient  General ROS: negative  Psychological ROS: negative  ENT ROS: negative  Allergy and Immunology ROS: negative  Endocrine ROS: negative  Respiratory ROS: positive for - cough and shortness of breath, wheezing  negative for - tachypnea  Cardiovascular ROS: no chest pain  Gastrointestinal ROS: no abdominal pain, change in bowel habits, or black or bloody stools  Genito-Urinary ROS: no dysuria, trouble voiding, or hematuria  Musculoskeletal ROS: negative  Neurological ROS: no TIA or stroke symptoms  Dermatological ROS: negative      Current Meds:  Current Outpatient Prescriptions   Medication Sig     albuterol (ACCUNEB) 1.25 mg/3 mL nebulizer solution Take 1.25 mg by nebulization 3 (three) times a day as needed for wheezing or shortness of breath.     albuterol (PROAIR HFA;PROVENTIL HFA;VENTOLIN HFA) 90 mcg/actuation inhaler Inhale 2 puffs every 6 (six) hours as needed for wheezing or shortness of breath.     fluticasone (FLOVENT HFA) 220 mcg/actuation inhaler Inhale 2 puffs 2 (two) times a day.     ibuprofen (ADVIL,MOTRIN) 200 MG tablet Take 400 mg by mouth every 6 (six) hours as needed for pain.     tiotropium (SPIRIVA) 18 mcg inhalation capsule Place 18 mcg into inhaler and inhale daily.     ipratropium-albuterol (DUO-NEB) 0.5-2.5 mg/3 mL nebulizer Take 3 mL by nebulization 4 (four) times a day for 15 days.       Labs:  No results found for this or any previous visit (from the past 72 hour(s)).    I have personally reviewed all imaging and PFT data available pertinent to this visit.    Imaging studies:  CTA CHEST PE RUN  8/4/2017 11:18 AM     INDICATION: Shortness of breath  TECHNIQUE: Helical acquisition through the chest was performed during the arterial  "phase of contrast enhancement using IV contrast. 2D and 3D reconstructions were performed by the CT technologist. Dose reduction techniques were used.   IV CONTRAST: Iohexol (Omni) 100 mL  COMPARISON: None.     FINDINGS:  ANGIOGRAM CHEST: Negative for pulmonary emboli. Negative for thoracic aortic dissection and aneurysms.     LUNGS AND PLEURA: There is mild atelectasis in the inferior lingula. Lungs otherwise clear. No pulmonary nodules or masses. No pneumothorax or pleural effusion. No focal airspace pneumonia.     MEDIASTINUM: No mediastinal or hilar lymphadenopathy. There are small aorticopulmonary window lymph nodes. No pericardial effusion. No coronary artery calcification. There is no axillary lymphadenopathy. The visualized thyroid is normal.     LIMITED UPPER ABDOMEN: There is an ovoid low-attenuation mass in the right adrenal gland measuring 1.8 x 0.8 cm. Given its attenuation this is most likely a benign adenoma.     MUSCULOSKELETAL: Negative.     IMPRESSION:   CONCLUSION:  1.  Pulmonary arteries are well-opacified and there is no evidence for pulmonary and was in either lung. No thoracic aortic aneurysm or thoracic aortic dissection.  2.  Atelectasis in the inferior lingula. Lungs are otherwise clear.  3.  Probable benign adenoma right adrenal gland.    PFTs:  FEV1/FVC is 48% and is reduced.  FEV1 is 40% predicted and is reduced.  FVC is 62% predicted and reduced.  There was improvement in spirometry after a single inhaled dose of bronchodilator.  TLC is 108% predicted and is normal.  RV is 176% predicted and is reduced.  DLCO is 84% predicted and is normal.    Impression:  Full Pulmonary Function Test is abnormal.  PFTs are consistent with severe  obstructive disease.  Bronchodilator response is consistent with reversibility.  There is no hyperinflation.  There is air-trapping.  Diffusion capacity when corrected for hemoglobin is normal.    Physical Exam:  /84  Pulse 80  Resp 24  Ht 5' 4\" " (1.626 m)  Wt (!) 234 lb 8 oz (106.4 kg)  SpO2 97% Comment: RA  BMI 40.25 kg/m2  General - Well nourished  Ears/Mouth - TMs clear bilaterally,  OP pink moist, no thrush  Neck - no cervical lymphadenopathy  Lungs - diffuse wheezing.    CVS - regular rhythm with no murmurs, rubs or gallups  Abdomen - soft, NT, ND, NABS  Ext - no cyanosis, clubbing or edema  Skin - no rash  Psychology - alert and oriented, answers appropriate      Assessment and Plan:  1. COPD - Worsening symptoms.  Currently on flovent, duonebs, and spiriva.  Switched from flovent to breo.  Renewed spiriva, but changed to respimat.  Counseled on inhaler technique.  FEV1 40% postbronchodilator, severe obstruction.  Quit smoking beginning of August after ER visit.  Ambulated 300 feet and fell only to 91%.  No need for supplemental oxygen currently.  Refer to pulm rehab.  Filled out paperwork for handicap sticker.  Check alpha 1 antitrypsin level, but already severe obstruction, so may not get much benefit from prolastin.  Mother had COPD at a young age as well.  Update vaccinations as needed.    F/U 3 months.        Francis Calixto MD  Pulmonary and Critical Care Medicine  [phone]

## 2021-06-14 NOTE — ANESTHESIA POSTPROCEDURE EVALUATION
Patient: Rosa Arreguin  Procedure(s):  LAPAROSCOPIC CHOLECYSTECTOMY  Anesthesia type: general    Patient location: PACU  Last vitals:   Vitals Value Taken Time   /78 01/04/21 1450   Temp 36.7  C (98.1  F) 01/04/21 1350   Pulse 101 01/04/21 1455   Resp 19 01/04/21 1455   SpO2 95 % 01/04/21 1455   Vitals shown include unvalidated device data.  Post vital signs: stable  Level of consciousness: awake and responds to simple questions  Post-anesthesia pain: pain controlled  Post-anesthesia nausea and vomiting: no  Pulmonary: unassisted, return to baseline  Cardiovascular: stable and blood pressure at baseline  Hydration: adequate  Anesthetic events: no    QCDR Measures:  ASA# 11 - Tammi-op Cardiac Arrest: ASA11B - Patient did NOT experience unanticipated cardiac arrest  ASA# 12 - Tammi-op Mortality Rate: ASA12B - Patient did NOT die  ASA# 13 - PACU Re-Intubation Rate: ASA13B - Patient did NOT require a new airway mgmt  ASA# 10 - Composite Anes Safety: ASA10A - No serious adverse event    Additional Notes:

## 2021-06-14 NOTE — ANESTHESIA PREPROCEDURE EVALUATION
Anesthesia Evaluation      Patient summary reviewed   No history of anesthetic complications     Airway   Mallampati: II  Neck ROM: full   Pulmonary - negative ROS and normal exam    breath sounds clear to auscultation  (+) COPD, asthma  a smoker                         Cardiovascular - negative ROS and normal exam  Exercise tolerance: > or = 4 METS  (+) hypertension, ,     ECG reviewed  Rhythm: regular  Rate: normal,         Neuro/Psych - negative ROS     Endo/Other - negative ROS   (+) diabetes mellitus, obesity,      GI/Hepatic/Renal - negative ROS           Dental - normal exam                        Anesthesia Plan  Planned anesthetic: general endotracheal    ASA 3   Induction: intravenous   Anesthetic plan and risks discussed with: patient    Post-op plan: routine recovery

## 2021-06-14 NOTE — PROGRESS NOTES
"Rosa Arreguin is a 55 y.o. female who is being evaluated via a billable video visit.      The patient has been notified of following:     \"This video visit will be conducted via a call between you and your physician/provider. We have found that certain health care needs can be provided without the need for an in-person physical exam.  This service lets us provide the care you need with a video conversation.  If a prescription is necessary we can send it directly to your pharmacy.  If lab work is needed we can place an order for that and you can then stop by our lab to have the test done at a later time.    Video visits are billed at different rates depending on your insurance coverage. Please reach out to your insurance provider with any questions.    If during the course of the call the physician/provider feels a video visit is not appropriate, you will not be charged for this service.\"    Patient has given verbal consent to a Video visit? Yes  How would you like to obtain your AVS? AVS Preference: MyChart.  If dropped by the video visit, the video invitation should be sent to: Text to cell phone: [phone]  Will anyone else be joining your video visit? No        Video Start Time: 1530    Additional provider notes:   CCx: Follow-up visit for asthma    HPI:   Rosa Arreguin is a 52-year-old female who presents to clinic the aforementioned chief complaint.  Over the course of the year based on her insurance coverage and symptom control she has been using Dulera twice a day and Spiriva daily.  Since her last visit with me, she has stopped smoking and is also lost 25 pounds in an attempt to lose weight and be healthier.  She states that she has also been to the emergency room once a week for the last 3 weeks because of abdominal pain and has subsequently been diagnosed with gallstones for which no intervention has been recommended presently.  She does not have any fevers, chills, chest pain, chest tightness but " endorses abdominal discomfort, wheezing.  COPD Assessment Test  How often do you cough?: 0  How much phlegm (mucous) do you have in your chest?: 0  How tight does your chest feel?: 2  How breathless are you after climbing a hill or flight of stairs?: 4  How limited are your activities at home?: 0  How confident are you leaving home despite your lung condition?: 0  How soundly do you sleep?: 2  How much energy do you have?: 2  Total Score: 10    From prior visit:  She had been diagnosed with asthma approximately 20 years ago, and COPD approximately 15 years ago.  For quite some time, she was using only an albuterol inhaler and was doing quite well.  However because of worsening symptoms, patient was tried on several inhalers including Dulera and Symbicort.  She does not remember why she stopped taking Advair, she says she had a reaction to the Symbicort and stopped taking that.  Currently, she is taking Flovent, Spiriva, duo nebs, and albuterol inhaler.  She has had trouble going up steps for years.  States the past 3 months is gotten especially worse to the point that she has to take a break when she gets to the top of stairs.. She does not need any help with any of her activities of daily living.  However, she is found it harder to keep it with her job as a  subsequently taken several days off of work.  She usually comes down with a cold or bronchitis twice a year receives steroids during those times.  Humidity and bending over may seem to make her breathing worse.  She otherwise denies any other specific alleviating or aggravating factors.    Inhaler Regimen: Spiriva, dulera  Work History: , [employer]  Hobbies: golfing  Pets: none  Tobacco Use: stopped 8/3, 40 years 1/2 ppd   Home Environment: [facility] daughter and boyfriend    ROS:  Pertinent positives alluded to in the HPI.  Review of systems is negative.    PMH (unchanged from prior visit):  Abnormal Pap smear  Asthma  Tobacco  abuse    PSH (unchanged from prior visit):  No past surgeries    SH (unchanged from prior visit):  Social History     Socioeconomic History     Marital status: Single     Spouse name: Not on file     Number of children: Not on file     Years of education: Not on file     Highest education level: Not on file   Occupational History     Not on file   Social Needs     Financial resource strain: Not on file     Food insecurity     Worry: Not on file     Inability: Not on file     Transportation needs     Medical: Not on file     Non-medical: Not on file   Tobacco Use     Smoking status: Former Smoker     Packs/day: 0.25     Years: 40.00     Pack years: 10.00     Last attempt to quit: 2020     Years since quittin.4     Smokeless tobacco: Never Used     Tobacco comment: 1-2 per day   Substance and Sexual Activity     Alcohol use: Not on file     Drug use: Not on file     Sexual activity: Not on file   Lifestyle     Physical activity     Days per week: Not on file     Minutes per session: Not on file     Stress: Not on file   Relationships     Social connections     Talks on phone: Not on file     Gets together: Not on file     Attends Bahai service: Not on file     Active member of club or organization: Not on file     Attends meetings of clubs or organizations: Not on file     Relationship status: Not on file     Intimate partner violence     Fear of current or ex partner: Not on file     Emotionally abused: Not on file     Physically abused: Not on file     Forced sexual activity: Not on file   Other Topics Concern     Not on file   Social History Narrative     Not on file       Family history (unchanged from prior visit):  Mother COPD      Current Meds:  Current Outpatient Medications   Medication Sig Dispense Refill     HYDROcodone-acetaminophen 5-325 mg per tablet Take 1-2 tablets by mouth every 4 (four) hours as needed for pain. 16 tablet 0     ibuprofen (ADVIL,MOTRIN) 200 MG tablet Take 400 mg by mouth  every 6 (six) hours as needed for pain.       ipratropium-albuterol (DUO-NEB) 0.5-2.5 mg/3 mL nebulizer Take 3 mL by nebulization every 4 (four) hours as needed. 180 mL 3     mometasone-formoterol (DULERA) 200-5 mcg/actuation HFAA inhaler Inhale 2 puffs 2 (two) times a day. 1 Inhaler 1     nicotine (NICODERM CQ) 21 mg/24 hr as needed.       ondansetron (ZOFRAN ODT) 4 MG disintegrating tablet Take 1-2 tablets (4-8 mg total) by mouth every 8 (eight) hours as needed for nausea. 10 tablet 0     polyethylene glycol (MIRALAX) 17 gram packet as needed.       tiotropium bromide (SPIRIVA RESPIMAT) 2.5 mcg/actuation Mist Inhale 2 puffs daily. 12 g 2     umeclidinium (INCRUSE ELLIPTA) 62.5 mcg/actuation DsDv inhaler Inhale 1 puff daily. 1 each 6     VENTOLIN HFA 90 mcg/actuation inhaler INHALE 2 PUFFS BY MOUTH FOUR TIMES DAILY AS NEEDED FOR COUGH OR WHEEZING OR SHORTNESS OF BREATH 18 g 11     No current facility-administered medications for this visit.          Labs (unchanged from prior visit):  No results found for this or any previous visit (from the past 72 hour(s)).    I have personally reviewed all imaging and PFT data available pertinent to this visit.    Imaging studies (unchanged from prior visit):  CTA CHEST PE RUN  8/4/2017 11:18 AM     INDICATION: Shortness of breath  TECHNIQUE: Helical acquisition through the chest was performed during the arterial phase of contrast enhancement using IV contrast. 2D and 3D reconstructions were performed by the CT technologist. Dose reduction techniques were used.   IV CONTRAST: Iohexol (Omni) 100 mL  COMPARISON: None.     FINDINGS:  ANGIOGRAM CHEST: Negative for pulmonary emboli. Negative for thoracic aortic dissection and aneurysms.     LUNGS AND PLEURA: There is mild atelectasis in the inferior lingula. Lungs otherwise clear. No pulmonary nodules or masses. No pneumothorax or pleural effusion. No focal airspace pneumonia.     MEDIASTINUM: No mediastinal or hilar lymphadenopathy.  There are small aorticopulmonary window lymph nodes. No pericardial effusion. No coronary artery calcification. There is no axillary lymphadenopathy. The visualized thyroid is normal.     LIMITED UPPER ABDOMEN: There is an ovoid low-attenuation mass in the right adrenal gland measuring 1.8 x 0.8 cm. Given its attenuation this is most likely a benign adenoma.     MUSCULOSKELETAL: Negative.     IMPRESSION:   CONCLUSION:  1.  Pulmonary arteries are well-opacified and there is no evidence for pulmonary and was in either lung. No thoracic aortic aneurysm or thoracic aortic dissection.  2.  Atelectasis in the inferior lingula. Lungs are otherwise clear.  3.  Probable benign adenoma right adrenal gland.    PFTs (unchanged from prior visit):  FEV1/FVC is 48% and is reduced.  FEV1 is 40% predicted and is reduced.  FVC is 62% predicted and reduced.  There was improvement in spirometry after a single inhaled dose of bronchodilator.  TLC is 108% predicted and is normal.  RV is 176% predicted and is reduced.  DLCO is 84% predicted and is normal.    Impression:  Full Pulmonary Function Test is abnormal.  PFTs are consistent with severe  obstructive disease.  Bronchodilator response is consistent with reversibility.  There is no hyperinflation.  There is air-trapping.  Diffusion capacity when corrected for hemoglobin is normal.    Assessment and Plan:  1. Asthma COPD overlap syndrome: He is compliant with both Dulera and Spiriva and continues to have shortness of breath and unfortunately continues to smoke. Note she did have alpha-1 antitrypsin levels checked at the last visit and these were noted to be normal.    Continue Dulera inhaler twice a day and was reminded to gargle after using using this.    Continue Spiriva once a day.    Continue as needed albuterol for rescue.    Reminded her to be more physically active on a routine basis--encouraged her to take regular walks.    Elevate head end of her bed when she sleeps.  2. HCM:      Qualifies for low-dose lung cancer screening.  We will discuss this at her next clinic visit.  3. Follow-up: 6 months.    Fide LOPEZ [facility]  Pulmonary and Critical Care  6113        Video-Visit Details    Type of service:  Video Visit    Video End Time (time video stopped): 4714  Originating Location (pt. Location): Home    Distant Location (provider location):  [facility]     Platform used for Video Visit: Patricia

## 2021-06-14 NOTE — PROGRESS NOTES
CCx: Follow-up visit for asthma    HPI:   Rosa Arreguin is a 52-year-old female who presents to clinic the aforementioned chief complaint.  She was initially evaluated by Dr. Calixto in clinic last month and was initiated on long-acting bronchodilators changed from her prior prescription.  He states that she has been compliant with Spiriva, Breo and has continued to feel the need to use her Ventolin inhaler.  She states that over the last several weeks she has been using Ventolin a few times a day.  She also endorses a cough and states that she had taken a break from cigarette use for a month but fell back into the habit last week and has been smoking half a pack a day.  He endorses a low-grade fever with cold-like symptoms and generalized malaise.  States that her gums have been feeling sore she wonders if she is coming down with something.  He has also been fairly wheezy and has had nighttime a week with all of these symptoms.    From prior visit:  She had been diagnosed with asthma approximately 20 years ago, and COPD approximately 15 years ago.  For quite some time, she was using only an albuterol inhaler and was doing quite well.  However because of worsening symptoms, patient was tried on several inhalers including Advair and Symbicort.  She does not remember why she stopped taking Advair, she says she had a reaction to the Symbicort and stopped taking that.  Currently, she is taking Flovent, Spiriva, duo nebs, and albuterol inhaler.  She has had trouble going up steps for years.  States the past 3 months is gotten especially worse to the point that she has to take a break when she gets to the top of stairs.. She does not need any help with any of her activities of daily living.  However, she is found it harder to keep it with her job as a  subsequently taken several days off of work.  She usually comes down with a cold or bronchitis twice a year receives steroids during those times.  Humidity and  bending over may seem to make her breathing worse.  She otherwise denies any other specific alleviating or aggravating factors.    Inhaler Regimen: flovent, spiriva, duonebs, albuterol inhaler: Tried in past: symbicort (reaction), advair    Work History: , [employer]  Hobbies: golfing  Pets: none  Tobacco Use: stopped 8/3, 40 years 1/2 ppd   Home Environment: [place], [facility] daughter and boyfriend    ROS:  Pertinent positives alluded to in the HPI.  Review of systems is negative.    PMH (unchanged from prior visit):  Abnormal Pap smear  Asthma  Tobacco abuse    PSH (unchanged from prior visit):  No past surgeries    SH (unchanged from prior visit):  Social History     Social History     Marital status: Single     Spouse name: N/A     Number of children: N/A     Years of education: N/A     Occupational History     Not on file.     Social History Main Topics     Smoking status: Former Smoker     Packs/day: 0.25     Years: 40.00     Quit date: 8/3/2017     Smokeless tobacco: Never Used     Alcohol use Not on file     Drug use: Not on file     Sexual activity: Not on file     Other Topics Concern     Not on file     Social History Narrative       Family history (unchanged from prior visit):  Mother COPD      Current Meds:  Current Outpatient Prescriptions   Medication Sig     albuterol (ACCUNEB) 1.25 mg/3 mL nebulizer solution Take 1.25 mg by nebulization 3 (three) times a day as needed for wheezing or shortness of breath.     albuterol (PROAIR HFA;PROVENTIL HFA;VENTOLIN HFA) 90 mcg/actuation inhaler Inhale 2 puffs every 6 (six) hours as needed for wheezing or shortness of breath.     fluticasone-vilanterol (BREO ELLIPTA) 200-25 mcg/dose DsDv inhaler Inhale 1 puff daily.     ibuprofen (ADVIL,MOTRIN) 200 MG tablet Take 400 mg by mouth every 6 (six) hours as needed for pain.     doxycycline (VIBRAMYCIN) 100 MG capsule Take 1 capsule (100 mg total) by mouth 2 (two) times a day for 10 days.      ipratropium-albuterol (DUO-NEB) 0.5-2.5 mg/3 mL nebulizer Take 3 mL by nebulization 4 (four) times a day for 15 days.     predniSONE (DELTASONE) 20 MG tablet Take 2 tablets (40 mg total) by mouth daily for 5 days.     umeclidinium (INCRUSE ELLIPTA) 62.5 mcg/actuation DsDv inhaler Inhale 1 puff daily.       Labs (unchanged from prior visit):  No results found for this or any previous visit (from the past 72 hour(s)).    I have personally reviewed all imaging and PFT data available pertinent to this visit.    Imaging studies (unchanged from prior visit):  CTA CHEST PE RUN  8/4/2017 11:18 AM     INDICATION: Shortness of breath  TECHNIQUE: Helical acquisition through the chest was performed during the arterial phase of contrast enhancement using IV contrast. 2D and 3D reconstructions were performed by the CT technologist. Dose reduction techniques were used.   IV CONTRAST: Iohexol (Omni) 100 mL  COMPARISON: None.     FINDINGS:  ANGIOGRAM CHEST: Negative for pulmonary emboli. Negative for thoracic aortic dissection and aneurysms.     LUNGS AND PLEURA: There is mild atelectasis in the inferior lingula. Lungs otherwise clear. No pulmonary nodules or masses. No pneumothorax or pleural effusion. No focal airspace pneumonia.     MEDIASTINUM: No mediastinal or hilar lymphadenopathy. There are small aorticopulmonary window lymph nodes. No pericardial effusion. No coronary artery calcification. There is no axillary lymphadenopathy. The visualized thyroid is normal.     LIMITED UPPER ABDOMEN: There is an ovoid low-attenuation mass in the right adrenal gland measuring 1.8 x 0.8 cm. Given its attenuation this is most likely a benign adenoma.     MUSCULOSKELETAL: Negative.     IMPRESSION:   CONCLUSION:  1.  Pulmonary arteries are well-opacified and there is no evidence for pulmonary and was in either lung. No thoracic aortic aneurysm or thoracic aortic dissection.  2.  Atelectasis in the inferior lingula. Lungs are otherwise  clear.  3.  Probable benign adenoma right adrenal gland.    PFTs (unchanged from prior visit):  FEV1/FVC is 48% and is reduced.  FEV1 is 40% predicted and is reduced.  FVC is 62% predicted and reduced.  There was improvement in spirometry after a single inhaled dose of bronchodilator.  TLC is 108% predicted and is normal.  RV is 176% predicted and is reduced.  DLCO is 84% predicted and is normal.    Impression:  Full Pulmonary Function Test is abnormal.  PFTs are consistent with severe  obstructive disease.  Bronchodilator response is consistent with reversibility.  There is no hyperinflation.  There is air-trapping.  Diffusion capacity when corrected for hemoglobin is normal.    Physical Exam:  /86  Pulse 85  Temp 97.8  F (36.6  C) (Oral)   Resp 19  Wt (!) 240 lb (108.9 kg)  SpO2 98% Comment: RA at rest  BMI 41.2 kg/m2  Physical Exam   Constitutional: She is oriented to person, place, and time. She appears well-developed and well-nourished.   HENT:   Head: Normocephalic and atraumatic.   Eyes: Pupils are equal, round, and reactive to light.   Neck: Normal range of motion.   Cardiovascular: Normal rate and regular rhythm.    Pulmonary/Chest: Effort normal and breath sounds normal. No respiratory distress. She has no wheezes. She has no rales.   Abdominal: Soft.   Musculoskeletal: Normal range of motion. She exhibits no edema.   Neurological: She is alert and oriented to person, place, and time.   Skin: Skin is warm.   Psychiatric: She has a normal mood and affect.       Assessment and Plan:  1. Asthma COPD overlap syndrome: Has been compliant with her inhaled bronchodilator regimen but continues to require rescue inhalers.  We did discuss that ongoing tobacco use certainly a trigger for her symptoms and that she needs to discontinue this altogether.  Is also likely that at the present by an infection given that she has some postnasal drip and has been feeling like she has significant malaise.  No she did  have alpha-1 antitrypsin levels checked at the last visit and these were noted to be normal.    Initiate a day course of doxycycline twice a day and a 5 day course of prednisone.    Continue Breo inhaler and will switch to increase per her insurance.  To gargle after using her Breo.    Continue as needed albuterol for rescue.    Elevate head end of her bed when she sleeps.    Was given a sinus wash bottle and instructions on how to use this.  I have advised her to do this twice a day.  2. HCM: Strongly urged to quit smoking.  Previously had handicap sticker paperwork filled out.  3. Follow-up: 8 weeks.    Fide Alvarezqvi  Pulmonary and Critical Care  [id number]

## 2021-06-14 NOTE — ANESTHESIA CARE TRANSFER NOTE
Last vitals:   Vitals:    01/04/21 1350   BP: 165/74   Pulse: 97   Resp: 12   Temp: 36.7  C (98.1  F)   SpO2: 94%     Patient's level of consciousness is drowsy  Spontaneous respirations: yes  Maintains airway independently: yes  Dentition unchanged: yes  Oropharynx: oropharynx clear of all foreign objects    QCDR Measures:  ASA# 20 - Surgical Safety Checklist: WHO surgical safety checklist completed prior to induction    PQRS# 430 - Adult PONV Prevention: 4558F - Pt received => 2 anti-emetic agents (different classes) preop & intraop  ASA# 8 - Peds PONV Prevention: NA - Not pediatric patient, not GA or 2 or more risk factors NOT present  PQRS# 424 - Tammi-op Temp Management: 4559F - At least one body temp DOCUMENTED => 35.5C or 95.9F within required timeframe  PQRS# 426 - PACU Transfer Protocol: - Transfer of care checklist used  ASA# 14 - Acute Post-op Pain: ASA14B - Patient did NOT experience pain >= 7 out of 10

## 2021-06-15 NOTE — PROGRESS NOTES
CCx: Follow-up visit for asthma    HPI:   Rosa Arreguin is a 52-year-old female who presents to clinic the aforementioned chief complaint.  Over the course of the year based on her insurance coverage and symptom control she has been using Dulera and Spiriva daily.  She states that she usually forgets her Dulera in the evening and will try to do a better job with this.  She continues to have dyspnea on exertion while walking up and down stairs but is otherwise not experiencing shortness of breath.  She had been trying to lose weight and was down about 27 pounds but then developed gallstones and had to change her diet pattern such that she is back to drinking significant amounts of soda.  She said that about a week and a half ago she has quit this again and is trying to lose weight again.    From prior visit:  She had been diagnosed with asthma approximately 20 years ago, and COPD approximately 15 years ago.  For quite some time, she was using only an albuterol inhaler and was doing quite well.  However because of worsening symptoms, patient was tried on several inhalers including Dulera and Symbicort.  She does not remember why she stopped taking Advair, she says she had a reaction to the Symbicort and stopped taking that.  Currently, she is taking Flovent, Spiriva, duo nebs, and albuterol inhaler.  She has had trouble going up steps for years.  States the past 3 months is gotten especially worse to the point that she has to take a break when she gets to the top of stairs.. She does not need any help with any of her activities of daily living.  However, she is found it harder to keep it with her job as a  subsequently taken several days off of work.  She usually comes down with a cold or bronchitis twice a year receives steroids during those times.  Humidity and bending over may seem to make her breathing worse.  She otherwise denies any other specific alleviating or aggravating factors.    Inhaler  Regimen: Spiriva, dulera  Work History: , [employer]  Hobbies: [employer]  Pets: none  Tobacco Use: stopped 8/3, 40 years 1/2 ppd   Home Environment: [place] daughter and boyfriend    ROS:  Pertinent positives alluded to in the HPI.  Review of systems is negative.    PMH (unchanged from prior visit):  Abnormal Pap smear  Asthma  Tobacco abuse    PSH (unchanged from prior visit):  No past surgeries    SH (unchanged from prior visit):  Social History     Socioeconomic History     Marital status: Single     Spouse name: None     Number of children: None     Years of education: None     Highest education level: None   Occupational History     None   Social Needs     Financial resource strain: None     Food insecurity     Worry: None     Inability: None     Transportation needs     Medical: None     Non-medical: None   Tobacco Use     Smoking status: Former Smoker     Packs/day: 1.00     Years: 42.00     Pack years: 42.00     Quit date: 2020     Years since quittin.1     Smokeless tobacco: Never Used     Tobacco comment: 1-2 per day   Substance and Sexual Activity     Alcohol use: Yes     Comment: occassionally     Drug use: Never     Sexual activity: None   Lifestyle     Physical activity     Days per week: None     Minutes per session: None     Stress: None   Relationships     Social connections     Talks on phone: None     Gets together: None     Attends Sabianist service: None     Active member of club or organization: None     Attends meetings of clubs or organizations: None     Relationship status: None     Intimate partner violence     Fear of current or ex partner: None     Emotionally abused: None     Physically abused: None     Forced sexual activity: None   Other Topics Concern     None   Social History Narrative     None     Family history (unchanged from prior visit):  Mother COPD      Current Meds:  Current Outpatient Medications   Medication Sig Dispense Refill      hydroCHLOROthiazide (HYDRODIURIL) 25 MG tablet Take 25 mg by mouth daily.       ibuprofen (ADVIL,MOTRIN) 200 MG tablet Take 400 mg by mouth every 6 (six) hours as needed for pain.       losartan (COZAAR) 25 MG tablet Take 25 mg by mouth daily.       metFORMIN (GLUCOPHAGE) 500 MG tablet Take 500 mg by mouth 2 (two) times a day with meals.       mometasone-formoterol (DULERA) 200-5 mcg/actuation HFAA inhaler Inhale 2 puffs 2 (two) times a day. 1 Inhaler 11     nicotine (NICODERM CQ) 14 mg/24 hr Place 1 patch on the skin daily.       ondansetron (ZOFRAN ODT) 4 MG disintegrating tablet Take 1-2 tablets (4-8 mg total) by mouth every 8 (eight) hours as needed for nausea. (Patient taking differently: Take 4-8 mg by mouth every 8 (eight) hours as needed for nausea. Has not started  Noted 12/28/20) 10 tablet 0     polyethylene glycol (MIRALAX) 17 gram packet as needed.       SPIRIVA RESPIMAT 2.5 mcg/actuation Mist Inhale 2 puffs daily. 12 g 0     traMADoL (ULTRAM) 50 mg tablet Take 50 mg by mouth at bedtime.       VENTOLIN HFA 90 mcg/actuation inhaler INHALE 2 PUFFS BY MOUTH FOUR TIMES DAILY AS NEEDED FOR COUGH OR WHEEZING OR SHORTNESS OF BREATH 18 g 11     ipratropium-albuterol (DUO-NEB) 0.5-2.5 mg/3 mL nebulizer Take 3 mL by nebulization every 4 (four) hours as needed. 180 mL 3     No current facility-administered medications for this visit.        Labs (unchanged from prior visit):  No results found for this or any previous visit (from the past 72 hour(s)).    I have personally reviewed all imaging and PFT data available pertinent to this visit.    Imaging studies (unchanged from prior visit):  CTA CHEST PE RUN  8/4/2017 11:18 AM     INDICATION: Shortness of breath  TECHNIQUE: Helical acquisition through the chest was performed during the arterial phase of contrast enhancement using IV contrast. 2D and 3D reconstructions were performed by the CT technologist. Dose reduction techniques were used.   IV CONTRAST: Iohexol  (Omni) 100 mL  COMPARISON: None.     FINDINGS:  ANGIOGRAM CHEST: Negative for pulmonary emboli. Negative for thoracic aortic dissection and aneurysms.     LUNGS AND PLEURA: There is mild atelectasis in the inferior lingula. Lungs otherwise clear. No pulmonary nodules or masses. No pneumothorax or pleural effusion. No focal airspace pneumonia.     MEDIASTINUM: No mediastinal or hilar lymphadenopathy. There are small aorticopulmonary window lymph nodes. No pericardial effusion. No coronary artery calcification. There is no axillary lymphadenopathy. The visualized thyroid is normal.     LIMITED UPPER ABDOMEN: There is an ovoid low-attenuation mass in the right adrenal gland measuring 1.8 x 0.8 cm. Given its attenuation this is most likely a benign adenoma.     MUSCULOSKELETAL: Negative.     IMPRESSION:   CONCLUSION:  1.  Pulmonary arteries are well-opacified and there is no evidence for pulmonary and was in either lung. No thoracic aortic aneurysm or thoracic aortic dissection.  2.  Atelectasis in the inferior lingula. Lungs are otherwise clear.  3.  Probable benign adenoma right adrenal gland.    PFTs (unchanged from prior visit):  FEV1/FVC is 48% and is reduced.  FEV1 is 40% predicted and is reduced.  FVC is 62% predicted and reduced.  There was improvement in spirometry after a single inhaled dose of bronchodilator.  TLC is 108% predicted and is normal.  RV is 176% predicted and is reduced.  DLCO is 84% predicted and is normal.    Impression:  Full Pulmonary Function Test is abnormal.  PFTs are consistent with severe  obstructive disease.  Bronchodilator response is consistent with reversibility.  There is no hyperinflation.  There is air-trapping.  Diffusion capacity when corrected for hemoglobin is normal.    Assessment and Plan:  1. Asthma COPD overlap syndrome: He is compliant with both Dulera and Spiriva and continues to have shortness of breath and unfortunately continues to smoke. Note she did have alpha-1  antitrypsin levels checked at the last visit and these were noted to be normal.    Continue Dulera inhaler twice a day and was reminded to gargle after using using this.  He was urged to use this twice a day.    Continue Spiriva once a day.    Continue as needed albuterol for rescue.    Reminded her to be more physically active on a routine basis.    Elevate head end of her bed when she sleeps.  2. HCM:   Lung Cancer Screening pre-scan counseling Visit  The patient fits the risk profile of patients who benefit from this screening:  -The patient is >55 years old and <80 years old  -The patient has 42 pack year history (over 30)  -The patient has smoked within the past 15 years  -The patient has no medical comorbidity severe enough that it would cause mortality prior to mortality due to the lung cancer attempting to be detected.  Discussion with patient regarding the harms associated with LDCT screening include false-negative and false-positive results, incidental findings, overdiagnosis, and radiation exposure were reviewed at length.   The patient understands that pursuing this screening test may result in a biopsy that was not necessary. It may also produced added stress over a nodule that is likely not cancer.  Of 100 patients who get screening, 25 will have a positive scan. Of those 25, only 1 will have cancer.  Overdiagnosis is estimated at 10% of patients-- they would not have been detected in the patient's lifetime without screening. Less than 1% of patients likely had death related to radiation exposure increase.   Average low-dose CT associated with 0.61 to 1.5 mSv. Annual background radiation exposure in the [place] averages 2.4 mS; mammogram is 0.7mSv.  The benefits are reduction in risk of death from lung cancer. The number needed to treat is 320 (for every 320 patients who undergo screening, 1 patient will have a benefit in mortality from early detection from the screening).  Undergoing this  screening implies willingness to pursue further potentially invasive testing to discover potential cancer.  All questions were answered.  1. The patient was counseled regarding smoking cessation and its risk for lung cancer.  3. Follow-up: 6 months.

## 2021-06-16 NOTE — PROGRESS NOTES
PULMONARY OUTPATIENT FOLLOW UP NOTE    Assessment:   1. Dyspnea  Secondary to asthma / COPD exacerbation.   Patient is 12 lbs up since September, poor diet habits, in addition to intermittent steroid use, mild swelling of LEs.  Continue steroid taper, add HCTZ, change diet habits.   2. Asthma / COPD with acute exacerbation   Previous PFTs showed a severe obstructive lung disease, significant post  bronchodilator response, normal diffusion capacity  Continue steroid taper   Continue LABA/ICS, LAMA and albuterol HFA as needed  3. Acute bronchitis   4. GERD  Likely a contributor to her poorly control asthma/COPD  Raise the bed of the bed  Avoid eating close to bedtime , at least three hours  Omeprazole 20 mg daily  5. Abnormal CT scan   Peripheral RLL nodular infiltrate extends into the pleural surface.   Inflammatory vs atelectasis.   Follow up chest CT scan in 6 months.  6. Tobacco user  Recently quit, smoking cessation counseling     Plan:   1. Steroid taper as instructed  2. Zpack  3. Continue BREO one puff daily , rinse your mouth with water after each use  4. Spiriva daily  5. Albuterol HFA / nebs as needed  6. Raise the bed of the bed  7. Avoid eating close to bedtime , at least three hours  8. Omeprazole 20 mg daily  9. Decrease salt and fluid intake  10. Start HCTZ 25 mg daily for 5 days, longer if persistent swelling of LEs  11. Contact us in 3 days to let us know how are you doing  12. Smoking cessation counseling  13. Follow up chest CT scan in 6 months   14. Follow up in 3 to 4 weeks with Dr. Ambriz    Thank you for this consultation.     Reji Carney  Pulmonary / Critical Care  2/13/2018      CC:      Chief Complaint   Patient presents with     Follow Up     asthma/copd overlap, not getting better even with abx/pred.  still really bad, voice it gone, sob, coughing up thick green stuff (starte thurs and has only gotten worse since then)       HPI:       Rosa Arreguin is an 52 y.o. female  who presents for evaluation of dyspnea.   Patient was seen by PCP on 1/26 diagnosed with acute bronchitis and asthma/COPD exacerbation, started on Abx and steroids.   Patient was seen in ED on 1/27/2018 for evaluation of chest pain, left flank pain and shortness of breath. Chest pain was worse with deep inspiration. Chest CT scan was done, study was negative for PE, no pulmonary infiltrates.   Patient contacted the PCP on 2/12 for evaluation of shortness of breath, she was started on prednisone and zpack. An urgent appointment in the pulmonary clinic was done.   Denies fever or chills. Reports productive cough of green sputum, unable to sleep due to cough, reports exertional dyspnea, and ongoing wheezes.   Denies chest pain, reports sleeping in her recliner due to shortness of breath and cough, denies PND. Reports mild swelling of LEs.   Complaints of acid reflux symptoms. Ongoing hoarseness mostly in AM  Tobacco use 1/2 ppd for 40 years. Quit recently.     PMH :  Abnormal Pap smear  Asthma / COPD    Medications:     Prior to Admission medications    Medication Sig Start Date End Date Taking? Authorizing Provider   albuterol (ACCUNEB) 1.25 mg/3 mL nebulizer solution Take 1.25 mg by nebulization 3 (three) times a day as needed for wheezing or shortness of breath.   Yes PROVIDER, HISTORICAL   albuterol (PROAIR HFA;PROVENTIL HFA;VENTOLIN HFA) 90 mcg/actuation inhaler Inhale 2 puffs every 6 (six) hours as needed for wheezing or shortness of breath.   Yes PROVIDER, HISTORICAL   BREO ELLIPTA 200-25 mcg/dose DsDv inhaler INHALE 1 PUFF DAILY. 1/8/18  Yes Radha Diez MD   ibuprofen (ADVIL,MOTRIN) 200 MG tablet Take 400 mg by mouth every 6 (six) hours as needed for pain.   Yes PROVIDER, HISTORICAL   predniSONE (DELTASONE) 10 mg tablet 40 mg (4 tabs) daily for 3 days then 30 mg (3 tabs) daily for 3 days then 20 mg (2 tabs) daily for 3 days then 10 mg daily 2/13/18  Yes Reji Carney MD   hydroCHLOROthiazide  (HYDRODIURIL) 25 MG tablet Take 1 tablet (25 mg total) by mouth daily. 2/13/18   Reji Carney MD   ipratropium-albuterol (DUO-NEB) 0.5-2.5 mg/3 mL nebulizer Take 3 mL by nebulization every 4 (four) hours as needed. 2/13/18 3/15/18  Reji Carney MD   omeprazole (PRILOSEC) 20 MG capsule Take 1 capsule (20 mg total) by mouth daily. 2/13/18   Reji Carney MD   oxyCODONE-acetaminophen (PERCOCET) 5-325 mg per tablet Take 1 tablet by mouth every 4 (four) hours as needed for pain. 1/27/18   Juan M Torres MD   umeclidinium (INCRUSE ELLIPTA) 62.5 mcg/actuation DsDv inhaler Inhale 1 puff daily. 11/6/17 12/6/17  Radha Diez MD     Family history (unchanged from prior visit):  Mother COPD  Social History     Social History     Marital status: Single     Spouse name: N/A     Number of children: N/A     Years of education: N/A     Occupational History     Not on file.     Social History Main Topics     Smoking status: Former Smoker     Packs/day: 0.25     Years: 40.00     Quit date: 8/3/2017     Smokeless tobacco: Never Used     Alcohol use Not on file     Drug use: Not on file     Sexual activity: Not on file     Other Topics Concern     Not on file     Social History Narrative       Review of Systems  A 12 point comprehensive review of systems was negative except as noted.        Objective:     Vitals:    02/13/18 1011   BP: 142/84   Pulse: 80   Resp: 20   Temp: 97.6  F (36.4  C)   TempSrc: Oral   SpO2: 96%   Weight: (!) 249 lb 1.6 oz (113 kg)     Gen: obese, awake, alert, no distress  HEENT: pink conjunctiva, moist mucosa, Mallampati III/IV  Neck: no thyromegaly, masses or JVD  Lungs: wheezes and ronchi both HT  CV: regular, no murmurs or gallops appreciated  Abdomen: soft, NT, BS wnl  Ext: edema 1+ both LEs  Neuro: CN II-XII intact, non focal      Diagnostic tests:   CTA CHEST PE RUN 8/4/2017 11:18 AM  INDICATION: Shortness of breath  COMPARISON:  None.  FINDINGS:  ANGIOGRAM CHEST: Negative for pulmonary emboli. Negative for thoracic aortic dissection and aneurysms.  LUNGS AND PLEURA: There is mild atelectasis in the inferior lingula. Lungs otherwise clear. No pulmonary nodules or masses. No pneumothorax or pleural effusion. No focal airspace pneumonia.  MEDIASTINUM: No mediastinal or hilar lymphadenopathy. There are small aorticopulmonary window lymph nodes. No pericardial effusion. No coronary artery calcification. There is no axillary lymphadenopathy. The visualized thyroid is normal.  LIMITED UPPER ABDOMEN: There is an ovoid low-attenuation mass in the right adrenal gland measuring 1.8 x 0.8 cm. Given its attenuation this is most likely a benign adenoma.  MUSCULOSKELETAL: Negative.  CONCLUSION:  1.  Pulmonary arteries are well-opacified and there is no evidence for pulmonary and was in either lung. No thoracic aortic aneurysm or thoracic aortic dissection.  2.  Atelectasis in the inferior lingula. Lungs are otherwise clear.  3.  Probable benign adenoma right adrenal gland.    Cta Chest Pe Run    Result Date: 1/27/2018  CTA CHEST PE RUN 01/27/2018, 8:42 AM INDICATION: Chest pain. Difficulty breathing. Chest pain. TECHNIQUE: Helical acquisition through the chest was performed during the arterial phase of contrast enhancement using IV contrast. 2D and 3D reconstructions were performed by the CT technologist. Dose reduction techniques were used. IV CONTRAST: Iohexol (Omni) 100 mL. COMPARISON: None. FINDINGS: ANGIOGRAM CHEST: Negative for pulmonary emboli. Negative for thoracic aortic dissection and aneurysms. LUNGS AND PLEURA: Peripheral right lower lobe nodule 1.8 x 1.0 cm which extends to the pleural surface; this may be a nodule or atelectasis. MEDIASTINUM: No adenopathy. No pericardial effusion. LIMITED UPPER ABDOMEN: 2.1 x 1.6 cm hypodense adenoma right adrenal gland. MUSCULOSKELETAL: Negative.     CONCLUSION: 1.  Peripheral right lower lobe pulmonary  nodule 1.8 x 1.0 cm. Follow-up CT in six months. 2.  No pulmonary emboli. NOTE: ABNORMAL REPORT THE DICTATION ABOVE DESCRIBES AN ABNORMALITY FOR WHICH FOLLOW-UP IS NEEDED. REFERENCE: Guidelines for Management of Incidental Pulmonary Nodules Detected on CT Images: From the Fleischner Society 2017. Guidelines apply to incidental nodules in patients who are 35 years or older. Guidelines do not apply to lung cancer screening, patients with immunosuppression, or patients with known primary cancer. SINGLE NODULE Nodule size <6 mm Low-risk patients: No follow-up needed. High-risk patients: Optional follow-up at 12 months. Nodule size 6-8 mm Low-risk patients: Follow-up CT at 6-12 months, then consider CT at 18-24 months. High-risk patients: Follow-up CT at 6-12 months, then at 18-24 months if no change. Nodule size >8 mm Either low or high-risk patients: Consider CT, PET/CT, or tissue sampling at 3 months. Consider referral to lung nodule clinic.     PFTs (unchanged from prior visit):  FEV1/FVC is 48% and is reduced.  FEV1 is 40% predicted and is reduced.  FVC is 62% predicted and reduced.  There was improvement in spirometry after a single inhaled dose of bronchodilator.  TLC is 108% predicted and is normal.  RV is 176% predicted and is reduced.  DLCO is 84% predicted and is normal.

## 2021-06-16 NOTE — PROGRESS NOTES
CCx: Follow-up visit for asthma    HPI:   Rosa Arreguin is a 52-year-old female who presents to clinic the aforementioned chief complaint.  She was initially evaluated by Dr. Calixto in clinic last month and was initiated on long-acting bronchodilator.  Over the course of the last month and a half or so she has been compliant with her Breo and was switched to Incuse Ellipta based on her insurance coverage.  She describes that ever since initiating the in cruise she began to feel extremely nauseous and recently saw her primary care provider who gave her a sample of Spiriva that she continues to currently use.  Her shortness of breath has been significantly improved since being compliant with both these long-acting bronchodilators and she hardly ever feels the need to use her rescue inhaler.  She was evaluated by Dr. Sellers in our clinic last month and complained of increased lower extremity edema which was likely related to her steroid use.  She was given a brief course of hydrochlorothiazide and states that her lower extremity edema has resolved.  She also thinks that her breathing is back to normal now and wonders if we could somehow get her Spiriva.    From prior visit:  She had been diagnosed with asthma approximately 20 years ago, and COPD approximately 15 years ago.  For quite some time, she was using only an albuterol inhaler and was doing quite well.  However because of worsening symptoms, patient was tried on several inhalers including Advair and Symbicort.  She does not remember why she stopped taking Advair, she says she had a reaction to the Symbicort and stopped taking that.  Currently, she is taking Flovent, Spiriva, duo nebs, and albuterol inhaler.  She has had trouble going up steps for years.  States the past 3 months is gotten especially worse to the point that she has to take a break when she gets to the top of stairs.. She does not need any help with any of her activities of daily living.   However, she is found it harder to keep it with her job as a  subsequently taken several days off of work.  She usually comes down with a cold or bronchitis twice a year receives steroids during those times.  Humidity and bending over may seem to make her breathing worse.  She otherwise denies any other specific alleviating or aggravating factors.    Inhaler Regimen: flovent, spiriva, duonebs, albuterol inhaler: Tried in past: symbicort (reaction), advair    Work History: , [employer]  Hobbies: golfing  Pets: none  Tobacco Use: stopped 8/3, 40 years 1/2 ppd   Home Environment: [facility] daughter and boyfriend    ROS:  Pertinent positives alluded to in the HPI.  Review of systems is negative.    PMH (unchanged from prior visit):  Abnormal Pap smear  Asthma  Tobacco abuse    PSH (unchanged from prior visit):  No past surgeries    SH (unchanged from prior visit):  Social History     Social History     Marital status: Single     Spouse name: N/A     Number of children: N/A     Years of education: N/A     Occupational History     Not on file.     Social History Main Topics     Smoking status: Former Smoker     Packs/day: 0.25     Years: 40.00     Quit date: 8/3/2017     Smokeless tobacco: Never Used     Alcohol use Not on file     Drug use: Not on file     Sexual activity: Not on file     Other Topics Concern     Not on file     Social History Narrative       Family history (unchanged from prior visit):  Mother COPD      Current Meds:  Current Outpatient Prescriptions   Medication Sig     albuterol (PROAIR HFA;PROVENTIL HFA;VENTOLIN HFA) 90 mcg/actuation inhaler Inhale 2 puffs every 6 (six) hours as needed for wheezing or shortness of breath.     BREO ELLIPTA 200-25 mcg/dose DsDv inhaler INHALE 1 PUFF DAILY.     ibuprofen (ADVIL,MOTRIN) 200 MG tablet Take 400 mg by mouth every 6 (six) hours as needed for pain.     ipratropium-albuterol (DUO-NEB) 0.5-2.5 mg/3 mL nebulizer Take 3 mL by  nebulization every 4 (four) hours as needed.     umeclidinium (INCRUSE ELLIPTA) 62.5 mcg/actuation DsDv inhaler Inhale 1 puff daily.       Labs (unchanged from prior visit):  No results found for this or any previous visit (from the past 72 hour(s)).    I have personally reviewed all imaging and PFT data available pertinent to this visit.    Imaging studies (unchanged from prior visit):  CTA CHEST PE RUN  8/4/2017 11:18 AM     INDICATION: Shortness of breath  TECHNIQUE: Helical acquisition through the chest was performed during the arterial phase of contrast enhancement using IV contrast. 2D and 3D reconstructions were performed by the CT technologist. Dose reduction techniques were used.   IV CONTRAST: Iohexol (Omni) 100 mL  COMPARISON: None.     FINDINGS:  ANGIOGRAM CHEST: Negative for pulmonary emboli. Negative for thoracic aortic dissection and aneurysms.     LUNGS AND PLEURA: There is mild atelectasis in the inferior lingula. Lungs otherwise clear. No pulmonary nodules or masses. No pneumothorax or pleural effusion. No focal airspace pneumonia.     MEDIASTINUM: No mediastinal or hilar lymphadenopathy. There are small aorticopulmonary window lymph nodes. No pericardial effusion. No coronary artery calcification. There is no axillary lymphadenopathy. The visualized thyroid is normal.     LIMITED UPPER ABDOMEN: There is an ovoid low-attenuation mass in the right adrenal gland measuring 1.8 x 0.8 cm. Given its attenuation this is most likely a benign adenoma.     MUSCULOSKELETAL: Negative.     IMPRESSION:   CONCLUSION:  1.  Pulmonary arteries are well-opacified and there is no evidence for pulmonary and was in either lung. No thoracic aortic aneurysm or thoracic aortic dissection.  2.  Atelectasis in the inferior lingula. Lungs are otherwise clear.  3.  Probable benign adenoma right adrenal gland.    PFTs (unchanged from prior visit):  FEV1/FVC is 48% and is reduced.  FEV1 is 40% predicted and is reduced.  FVC is  62% predicted and reduced.  There was improvement in spirometry after a single inhaled dose of bronchodilator.  TLC is 108% predicted and is normal.  RV is 176% predicted and is reduced.  DLCO is 84% predicted and is normal.    Impression:  Full Pulmonary Function Test is abnormal.  PFTs are consistent with severe  obstructive disease.  Bronchodilator response is consistent with reversibility.  There is no hyperinflation.  There is air-trapping.  Diffusion capacity when corrected for hemoglobin is normal.    Physical Exam:  /84  Pulse 84  Resp 24  Wt (!) 239 lb 1.6 oz (108.5 kg)  LMP 08/04/2017  SpO2 96% Comment: RA  BMI 41.04 kg/m2  Physical Exam   Constitutional: She is oriented to person, place, and time. She appears well-developed and well-nourished.   HENT:   Head: Normocephalic and atraumatic.   Eyes: Pupils are equal, round, and reactive to light.   Neck: Normal range of motion.   Cardiovascular: Normal rate and regular rhythm.    Pulmonary/Chest: Effort normal and breath sounds normal. No respiratory distress. She has no wheezes. She has no rales.   Abdominal: Soft.   Musculoskeletal: Normal range of motion. She exhibits no edema.   Neurological: She is alert and oriented to person, place, and time.   Skin: Skin is warm.   Psychiatric: She has a normal mood and affect.       Assessment and Plan:  1. Asthma COPD overlap syndrome: Has been compliant with her inhaled bronchodilator regimen did not do well with Incuse Ellipta and would like to be switched back to Spiriva.  We did discuss that ongoing tobacco use certainly a trigger for her symptoms and that she needs to discontinue this altogether.  Is also likely that at the present by an infection given that she has some postnasal drip and has been feeling like she has significant malaise.  No she did have alpha-1 antitrypsin levels checked at the last visit and these were noted to be normal.    Continue Breo inhaler and was reminded to gargle  after using her Breo.    I have given her prescription for Spiriva and we will work on a prior authorization for this.  In the meantime she has been requested to continue with the use of Lipitor.    Continue as needed albuterol for rescue.    Elevate head end of her bed when she sleeps.    Was given a sinus wash bottle and instructions on how to use this.  I have advised her to do this twice a day.  2. HCM: Strongly urged to quit smoking.    3. Follow-up: 8 weeks.    Fide Alvarezqvi  Pulmonary and Critical Care  [id number]

## 2021-06-16 NOTE — PROGRESS NOTES
PA for the Spiriva Respimat was approved from 2/20/18-3/21/2021.  I called and informed pharmacy and LM for pt with this info.

## 2021-06-19 NOTE — LETTER
Letter by Fide Ambriz MD at      Author: Fide Ambriz MD Service: -- Author Type: --    Filed:  Encounter Date: 8/29/2019 Status: (Other)         Noel LAZAR MD  [address] [facility] [address]                                  September 9, 2019    Patient: Rosa Arreguin   MR Number: [id number]   YOB: 1965   Date of Visit: 8/29/2019     Dear Dr. Maureen MD:    Thank you for referring Rosa Arreguin to me for evaluation. Below are the relevant portions of my assessment and plan of care.    If you have questions, please do not hesitate to call me. I look forward to following Rosa along with you.    Sincerely,        Fide Ambriz MD          CC  No Recipients  Fide Ambriz MD  9/9/2019 10:51 AM  Sign at close encounter  CCx: Follow-up visit for asthma    HPI:   Rosa Arreguin is a 52-year-old female who presents to clinic the aforementioned chief complaint.  Over the course of the year based on her insurance coverage and symptom control she has been using Dulera twice a day and Spiriva daily.  Unfortunately she continues to smoke half a pack of cigarettes a day and endorses shortness of breath at rest and dyspnea on exertion while walking 200 yards.  She has no chest pain, chest tightness, coughing, fevers, chills but does occasionally have night sweats and attributes these to menopausal symptoms.  Her CAT today is 3+1+0+5+1+1+1+4 = 16    From prior visit:  She had been diagnosed with asthma approximately 20 years ago, and COPD approximately 15 years ago.  For quite some time, she was using only an albuterol inhaler and was doing quite well.  However because of worsening symptoms, patient was tried on several inhalers including Advair and Symbicort.  She does not remember why she stopped taking Advair, she says she had a reaction to the Symbicort and stopped taking that.  Currently, she is taking Flovent, Spiriva, duo nebs, and albuterol inhaler.  She  has had trouble going up steps for years.  States the past 3 months is gotten especially worse to the point that she has to take a break when she gets to the top of stairs.. She does not need any help with any of her activities of daily living.  However, she is found it harder to keep it with her job as a  subsequently taken several days off of work.  She usually comes down with a cold or bronchitis twice a year receives steroids during those times.  Humidity and bending over may seem to make her breathing worse.  She otherwise denies any other specific alleviating or aggravating factors.    Inhaler Regimen: flovent, spiriva, duonebs, albuterol inhaler: Tried in past: symbicort (reaction), advair    Work History: , [employer]  Hobbies: golfing  Pets: none  Tobacco Use: stopped 8/3, 40 years  ppd   Home Environment: [facility] daughter and boyfriend    ROS:  Pertinent positives alluded to in the HPI.  Review of systems is negative.    PMH (unchanged from prior visit):  Abnormal Pap smear  Asthma  Tobacco abuse    PSH (unchanged from prior visit):  No past surgeries    SH (unchanged from prior visit):  Social History     Socioeconomic History   ? Marital status: Single     Spouse name: Not on file   ? Number of children: Not on file   ? Years of education: Not on file   ? Highest education level: Not on file   Occupational History   ? Not on file   Social Needs   ? Financial resource strain: Not on file   ? Food insecurity:     Worry: Not on file     Inability: Not on file   ? Transportation needs:     Medical: Not on file     Non-medical: Not on file   Tobacco Use   ? Smoking status: Current Every Day Smoker     Packs/day: 0.25     Years: 40.00     Pack years: 10.00     Last attempt to quit: 8/3/2017     Years since quittin.0   ? Smokeless tobacco: Never Used   ? Tobacco comment: 1-2 per day   Substance and Sexual Activity   ? Alcohol use: Not on file   ? Drug use: Not on file   ?  Sexual activity: Not on file   Lifestyle   ? Physical activity:     Days per week: Not on file     Minutes per session: Not on file   ? Stress: Not on file   Relationships   ? Social connections:     Talks on phone: Not on file     Gets together: Not on file     Attends Taoism service: Not on file     Active member of club or organization: Not on file     Attends meetings of clubs or organizations: Not on file     Relationship status: Not on file   ? Intimate partner violence:     Fear of current or ex partner: Not on file     Emotionally abused: Not on file     Physically abused: Not on file     Forced sexual activity: Not on file   Other Topics Concern   ? Not on file   Social History Narrative   ? Not on file       Family history (unchanged from prior visit):  Mother COPD      Current Meds:  Current Outpatient Medications   Medication Sig   ? ibuprofen (ADVIL,MOTRIN) 200 MG tablet Take 400 mg by mouth every 6 (six) hours as needed for pain.   ? ipratropium-albuterol (DUO-NEB) 0.5-2.5 mg/3 mL nebulizer Take 3 mL by nebulization every 4 (four) hours as needed.   ? mometasone-formoterol (DULERA) 200-5 mcg/actuation HFAA inhaler Inhale 2 puffs 2 (two) times a day.   ? tiotropium bromide (SPIRIVA RESPIMAT) 2.5 mcg/actuation Mist Inhale 2 puffs daily.   ? VENTOLIN HFA 90 mcg/actuation inhaler INHALE 2 PUFFS BY MOUTH FOUR TIMES DAILY AS NEEDED FOR COUGH OR WHEEZING OR SHORTNESS OF BREATH   ? umeclidinium (INCRUSE ELLIPTA) 62.5 mcg/actuation DsDv inhaler Inhale 1 puff daily.       Labs (unchanged from prior visit):  No results found for this or any previous visit (from the past 72 hour(s)).    I have personally reviewed all imaging and PFT data available pertinent to this visit.    Imaging studies (unchanged from prior visit):  CTA CHEST PE RUN  8/4/2017 11:18 AM     INDICATION: Shortness of breath  TECHNIQUE: Helical acquisition through the chest was performed during the arterial phase of contrast enhancement using  IV contrast. 2D and 3D reconstructions were performed by the CT technologist. Dose reduction techniques were used.   IV CONTRAST: Iohexol (Omni) 100 mL  COMPARISON: None.     FINDINGS:  ANGIOGRAM CHEST: Negative for pulmonary emboli. Negative for thoracic aortic dissection and aneurysms.     LUNGS AND PLEURA: There is mild atelectasis in the inferior lingula. Lungs otherwise clear. No pulmonary nodules or masses. No pneumothorax or pleural effusion. No focal airspace pneumonia.     MEDIASTINUM: No mediastinal or hilar lymphadenopathy. There are small aorticopulmonary window lymph nodes. No pericardial effusion. No coronary artery calcification. There is no axillary lymphadenopathy. The visualized thyroid is normal.     LIMITED UPPER ABDOMEN: There is an ovoid low-attenuation mass in the right adrenal gland measuring 1.8 x 0.8 cm. Given its attenuation this is most likely a benign adenoma.     MUSCULOSKELETAL: Negative.     IMPRESSION:   CONCLUSION:  1.  Pulmonary arteries are well-opacified and there is no evidence for pulmonary and was in either lung. No thoracic aortic aneurysm or thoracic aortic dissection.  2.  Atelectasis in the inferior lingula. Lungs are otherwise clear.  3.  Probable benign adenoma right adrenal gland.    PFTs (unchanged from prior visit):  FEV1/FVC is 48% and is reduced.  FEV1 is 40% predicted and is reduced.  FVC is 62% predicted and reduced.  There was improvement in spirometry after a single inhaled dose of bronchodilator.  TLC is 108% predicted and is normal.  RV is 176% predicted and is reduced.  DLCO is 84% predicted and is normal.    Impression:  Full Pulmonary Function Test is abnormal.  PFTs are consistent with severe  obstructive disease.  Bronchodilator response is consistent with reversibility.  There is no hyperinflation.  There is air-trapping.  Diffusion capacity when corrected for hemoglobin is normal.    Physical Exam:  /82   Pulse 88   Resp 24   Wt (!) 233 lb 6.4  oz (105.9 kg)   LMP 08/04/2017   SpO2 93% Comment: RA  BMI 40.06 kg/m     Physical Exam   Constitutional: She is oriented to person, place, and time. She appears well-developed and well-nourished.   HENT:   Head: Normocephalic and atraumatic.   Eyes: Pupils are equal, round, and reactive to light.   Neck: Normal range of motion.   Cardiovascular: Normal rate and regular rhythm.   Pulmonary/Chest: Effort normal and breath sounds normal. No respiratory distress. She has no wheezes. She has no rales.   Abdominal: Soft.   Musculoskeletal: Normal range of motion. She exhibits no edema.   Neurological: She is alert and oriented to person, place, and time.   Skin: Skin is warm.   Psychiatric: She has a normal mood and affect.       Assessment and Plan:  1. Asthma COPD overlap syndrome: He is compliant with both Dulera and Spiriva and continues to have shortness of breath and unfortunately continues to smoke. Note she did have alpha-1 antitrypsin levels checked at the last visit and these were noted to be normal.    Continue Dulera inhaler twice a day and was reminded to gargle after using her Breo.    Continue Spiriva once a day.    Continue as needed albuterol for rescue.    Reminded her to be more physically active on a routine basis.    Elevate head end of her bed when she sleeps.    We talked at length about tobacco cessation given the this is likely leading to her ongoing shortness of breath.  2. HCM:     Strongly urged to quit smoking.      Qualifies for low-dose lung cancer screening.  We will discuss this at her next clinic visit.  3. Follow-up: 6 months.    Fide Alvarezqvi  Pulmonary and Critical Care  [id number]

## 2021-06-19 NOTE — PROGRESS NOTES
"CCx: Follow-up visit for asthma    HPI:   Rosa Arreguin is a 52-year-old female who presents to clinic the aforementioned chief complaint.  She has been treated with combination long-acting bronchodilator review and Spiriva and has had reasonable control of his symptoms from this.  She was briefly trialed on Incuse due to her insurance coverage, however, felt significantly worse on the sudden was the switch back to Spiriva.  She presently uses albuterol testing in the morning to help \"open up her lungs\" prior to using Breo and Spiriva.  She otherwise uses albuterol 5-8 times a week particularly when she climbs up a flight of stairs.  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  She continues to smoke and also has significant consumption of caffeinated, carbonated beverages.  Her ACT today is 4+1+5+2+3 = 15    From prior visit:  She had been diagnosed with asthma approximately 20 years ago, and COPD approximately 15 years ago.  For quite some time, she was using only an albuterol inhaler and was doing quite well.  However because of worsening symptoms, patient was tried on several inhalers including Advair and Symbicort.  She does not remember why she stopped taking Advair, she says she had a reaction to the Symbicort and stopped taking that.  Currently, she is taking Flovent, Spiriva, duo nebs, and albuterol inhaler.  She has had trouble going up steps for years.  States the past 3 months is gotten especially worse to the point that she has to take a break when she gets to the top of stairs.. She does not need any help with any of her activities of daily living.  However, she is found it harder to keep it with her job as a  subsequently taken several days off of work.  She usually comes down with a cold or bronchitis twice a year receives steroids during those times.  Humidity and bending over may seem to make her " breathing worse.  She otherwise denies any other specific alleviating or aggravating factors.    Inhaler Regimen: flovent, spiriva, duonebs, albuterol inhaler: Tried in past: symbicort (reaction), advair    Work History: , [employer]  Hobbies: golfing  Pets: none  Tobacco Use: stopped 8/3, 40 years 1/2 ppd   Home Environment: [facility] daughter and boyfriend    ROS:  Pertinent positives alluded to in the HPI.  Review of systems is negative.    PMH (unchanged from prior visit):  Abnormal Pap smear  Asthma  Tobacco abuse    PSH (unchanged from prior visit):  No past surgeries    SH (unchanged from prior visit):  Social History     Social History     Marital status: Single     Spouse name: N/A     Number of children: N/A     Years of education: N/A     Occupational History     Not on file.     Social History Main Topics     Smoking status: Current Every Day Smoker     Packs/day: 0.25     Years: 40.00     Last attempt to quit: 8/3/2017     Smokeless tobacco: Never Used      Comment: 1-2 per day     Alcohol use Not on file     Drug use: Not on file     Sexual activity: Not on file     Other Topics Concern     Not on file     Social History Narrative       Family history (unchanged from prior visit):  Mother COPD      Current Meds:  Current Outpatient Prescriptions   Medication Sig Note     albuterol (PROAIR HFA;PROVENTIL HFA;VENTOLIN HFA) 90 mcg/actuation inhaler Inhale 2 puffs every 6 (six) hours as needed for wheezing or shortness of breath.      BREO ELLIPTA 200-25 mcg/dose DsDv inhaler INHALE 1 PUFF DAILY.      HYDROcodone-acetaminophen 5-325 mg per tablet as needed. 7/9/2018: Received from: External Pharmacy     ibuprofen (ADVIL,MOTRIN) 200 MG tablet Take 400 mg by mouth every 6 (six) hours as needed for pain.      tiotropium bromide (SPIRIVA RESPIMAT) 2.5 mcg/actuation Mist Inhale 2 puffs daily.      ipratropium-albuterol (DUO-NEB) 0.5-2.5 mg/3 mL nebulizer Take 3 mL by nebulization every 4  (four) hours as needed.      umeclidinium (INCRUSE ELLIPTA) 62.5 mcg/actuation DsDv inhaler Inhale 1 puff daily.        Labs (unchanged from prior visit):  No results found for this or any previous visit (from the past 72 hour(s)).    I have personally reviewed all imaging and PFT data available pertinent to this visit.    Imaging studies (unchanged from prior visit):  CTA CHEST PE RUN  8/4/2017 11:18 AM     INDICATION: Shortness of breath  TECHNIQUE: Helical acquisition through the chest was performed during the arterial phase of contrast enhancement using IV contrast. 2D and 3D reconstructions were performed by the CT technologist. Dose reduction techniques were used.   IV CONTRAST: Iohexol (Omni) 100 mL  COMPARISON: None.     FINDINGS:  ANGIOGRAM CHEST: Negative for pulmonary emboli. Negative for thoracic aortic dissection and aneurysms.     LUNGS AND PLEURA: There is mild atelectasis in the inferior lingula. Lungs otherwise clear. No pulmonary nodules or masses. No pneumothorax or pleural effusion. No focal airspace pneumonia.     MEDIASTINUM: No mediastinal or hilar lymphadenopathy. There are small aorticopulmonary window lymph nodes. No pericardial effusion. No coronary artery calcification. There is no axillary lymphadenopathy. The visualized thyroid is normal.     LIMITED UPPER ABDOMEN: There is an ovoid low-attenuation mass in the right adrenal gland measuring 1.8 x 0.8 cm. Given its attenuation this is most likely a benign adenoma.     MUSCULOSKELETAL: Negative.     IMPRESSION:   CONCLUSION:  1.  Pulmonary arteries are well-opacified and there is no evidence for pulmonary and was in either lung. No thoracic aortic aneurysm or thoracic aortic dissection.  2.  Atelectasis in the inferior lingula. Lungs are otherwise clear.  3.  Probable benign adenoma right adrenal gland.    PFTs (unchanged from prior visit):  FEV1/FVC is 48% and is reduced.  FEV1 is 40% predicted and is reduced.  FVC is 62% predicted and  reduced.  There was improvement in spirometry after a single inhaled dose of bronchodilator.  TLC is 108% predicted and is normal.  RV is 176% predicted and is reduced.  DLCO is 84% predicted and is normal.    Impression:  Full Pulmonary Function Test is abnormal.  PFTs are consistent with severe  obstructive disease.  Bronchodilator response is consistent with reversibility.  There is no hyperinflation.  There is air-trapping.  Diffusion capacity when corrected for hemoglobin is normal.    Physical Exam:  /72  Pulse 80  Resp 24  Wt (!) 235 lb 4.8 oz (106.7 kg)  LMP 08/04/2017  SpO2 95% Comment: RA  BMI 40.39 kg/m2  Physical Exam   Constitutional: She is oriented to person, place, and time. She appears well-developed and well-nourished.   HENT:   Head: Normocephalic and atraumatic.   Eyes: Pupils are equal, round, and reactive to light.   Neck: Normal range of motion.   Cardiovascular: Normal rate and regular rhythm.    Pulmonary/Chest: Effort normal and breath sounds normal. No respiratory distress. She has no wheezes. She has no rales.   Abdominal: Soft.   Musculoskeletal: Normal range of motion. She exhibits no edema.   Neurological: She is alert and oriented to person, place, and time.   Skin: Skin is warm.   Psychiatric: She has a normal mood and affect.       Assessment and Plan:  1. Asthma COPD overlap syndrome: Has been compliant with her inhaled bronchodilator regimen did not do well with Incuse Ellipta and was switched back to Spiriva.  We did discuss that ongoing tobacco use certainly a trigger for her symptoms and that she needs to discontinue this altogether. Note she did have alpha-1 antitrypsin levels checked at the last visit and these were noted to be normal.    Continue Breo inhaler and was reminded to gargle after using her Breo.    Continue Spiriva once a day.    Continue as needed albuterol for rescue.    Encouraged her to be more physically active on a routine basis.    Elevate head  end of her bed when she sleeps.    Was given a sinus wash bottle and instructions on how to use this.  I have advised her to do this twice a day.  2. HCM: Strongly urged to quit smoking.    3. Follow-up: 6 months.    Fide Ambriz  Pulmonary and Critical Care  [id number]

## 2021-06-20 NOTE — LETTER
Letter by Severson, Tammie F, LPN at      Author: Severson, Tammie F, LPN Service: -- Author Type: --    Filed:  Encounter Date: 4/30/2020 Status: (Other)       4/30/2020        Rosa Arreguin  [address]   [facility] [address]    This letter provides a written record that you were tested for COVID-19 on 4/28/20.     Your result was negative.    This means that we didnt find the virus that causes COVID-19 in your sample. A test may show negative when you do actually have the virus. This can happen when the virus is in the early stages of infection, before you feel illness symptoms.    Even if you dont have symptoms, they may still appear. For safety, its very important to follow these rules.    Keep yourself away from others (self-isolation):      Stay home. Dont go to work, school or anywhere else.     Stay away from others in your home. No hugging, kissing or shaking hands.    Dont let anyone visit.    Cover your mouth and nose with a mask, tissue or wash cloth to avoid spreading germs.    Stay in self-isolation until you meet ALL of the guidelines below:    1. You have had no fever for at least 72 hours (that is 3 full days of no fever without the use of medicine that reduces fevers), AND  2. other symptoms (such as cough, shortness of breath) have gotten better, AND  3. at least 7 days have passed since your symptoms first appeared.    Going back to work  Check with your employer for any guidelines to follow for going back to work.    Employers: This document serves as formal notice that your employee tested negative for COVID-19, as of the testing date shown above.    For questions regarding this letter or your Negative COVID-19 result, call [phone] between 8A to 6:30P (M-F) and 10A to 6:30P (weekends).

## 2021-06-21 ENCOUNTER — TELEPHONE (OUTPATIENT)
Dept: PHARMACY | Facility: CLINIC | Age: 56
End: 2021-06-21

## 2021-06-21 NOTE — TELEPHONE ENCOUNTER
Called patient today, 6/21/21, about rescheduling MTM appt from 11 am that day to 12 pm due to double booking. No answer. Left VM and CB#.     Lauren Bloch, PharmD  Medication Therapy Management Pharmacist   [facility]

## 2021-08-05 ENCOUNTER — LAB REQUISITION (OUTPATIENT)
Dept: LAB | Facility: CLINIC | Age: 56
End: 2021-08-05

## 2021-08-05 DIAGNOSIS — G62.9 POLYNEUROPATHY, UNSPECIFIED: ICD-10-CM

## 2021-08-05 LAB
TSH SERPL DL<=0.005 MIU/L-ACNC: 2.04 UIU/ML (ref 0.3–5)
VIT B12 SERPL-MCNC: 244 PG/ML (ref 213–816)

## 2021-08-05 PROCEDURE — 82607 VITAMIN B-12: CPT | Performed by: FAMILY MEDICINE

## 2021-08-05 PROCEDURE — 84443 ASSAY THYROID STIM HORMONE: CPT | Performed by: FAMILY MEDICINE

## 2021-08-15 ENCOUNTER — HEALTH MAINTENANCE LETTER (OUTPATIENT)
Age: 56
End: 2021-08-15

## 2021-09-16 ENCOUNTER — HOSPITAL ENCOUNTER (EMERGENCY)
Facility: CLINIC | Age: 56
Discharge: HOME | End: 2021-09-16
Payer: COMMERCIAL

## 2021-09-16 VITALS
TEMPERATURE: 98.5 F | BODY MASS INDEX: 42.1 KG/M2 | RESPIRATION RATE: 18 BRPM | OXYGEN SATURATION: 96 % | HEART RATE: 105 BPM | HEIGHT: 63 IN | SYSTOLIC BLOOD PRESSURE: 143 MMHG | DIASTOLIC BLOOD PRESSURE: 85 MMHG

## 2021-09-16 NOTE — ED TRIAGE NOTES
Patient presents to the ED with complaints of low back pain after tripping on some boxes in the entry way at work this afternoon. She reports that she has low back pain, denies LOC or head injury.

## 2021-09-17 ENCOUNTER — APPOINTMENT (OUTPATIENT)
Dept: RADIOLOGY | Facility: CLINIC | Age: 56
End: 2021-09-17
Payer: OTHER MISCELLANEOUS

## 2021-09-17 ENCOUNTER — HOSPITAL ENCOUNTER (EMERGENCY)
Facility: CLINIC | Age: 56
Discharge: HOME | End: 2021-09-17
Admitting: EMERGENCY MEDICINE
Payer: OTHER MISCELLANEOUS

## 2021-09-17 VITALS
WEIGHT: 212 LBS | HEIGHT: 62 IN | SYSTOLIC BLOOD PRESSURE: 131 MMHG | HEART RATE: 78 BPM | DIASTOLIC BLOOD PRESSURE: 77 MMHG | RESPIRATION RATE: 20 BRPM | OXYGEN SATURATION: 99 % | TEMPERATURE: 98 F | BODY MASS INDEX: 39.01 KG/M2

## 2021-09-17 DIAGNOSIS — S32.009A CLOSED FRACTURE OF LUMBAR VERTEBRA (H): ICD-10-CM

## 2021-09-17 PROCEDURE — 250N000011 HC RX IP 250 OP 636: Performed by: PHYSICIAN ASSISTANT

## 2021-09-17 PROCEDURE — 96372 THER/PROPH/DIAG INJ SC/IM: CPT | Performed by: PHYSICIAN ASSISTANT

## 2021-09-17 PROCEDURE — 72100 X-RAY EXAM L-S SPINE 2/3 VWS: CPT

## 2021-09-17 PROCEDURE — 99284 EMERGENCY DEPT VISIT MOD MDM: CPT | Mod: 25

## 2021-09-17 RX ORDER — KETOROLAC TROMETHAMINE 30 MG/ML
30 INJECTION, SOLUTION INTRAMUSCULAR; INTRAVENOUS ONCE
Status: COMPLETED | OUTPATIENT
Start: 2021-09-17 | End: 2021-09-17

## 2021-09-17 RX ORDER — CYCLOBENZAPRINE HCL 10 MG
10 TABLET ORAL 3 TIMES DAILY PRN
Qty: 20 TABLET | Refills: 0 | Status: SHIPPED | OUTPATIENT
Start: 2021-09-17 | End: 2021-09-23

## 2021-09-17 RX ORDER — OXYCODONE AND ACETAMINOPHEN 5; 325 MG/1; MG/1
1-2 TABLET ORAL EVERY 4 HOURS PRN
Qty: 12 TABLET | Refills: 0 | Status: SHIPPED | OUTPATIENT
Start: 2021-09-17 | End: 2021-11-17

## 2021-09-17 RX ADMIN — KETOROLAC TROMETHAMINE 30 MG: 30 INJECTION, SOLUTION INTRAMUSCULAR; INTRAVENOUS at 09:33

## 2021-09-17 ASSESSMENT — ENCOUNTER SYMPTOMS
SHORTNESS OF BREATH: 0
BACK PAIN: 1

## 2021-09-17 ASSESSMENT — MIFFLIN-ST. JEOR: SCORE: 1504.88

## 2021-09-17 NOTE — DISCHARGE INSTRUCTIONS
Please use pain medication as needed.  Focus on ice and rest.  Please follow-up with your back and spine clinic for further care and assessment.  They may consider bracing for some management.  You have sustained a very small compression fracture of L4.

## 2021-09-17 NOTE — ED PROVIDER NOTES
EMERGENCY DEPARTMENT ENCOUNTER      NAME: Rosa Arreguin  AGE: 56 year old female  YOB: 1965  MRN: [id number]  EVALUATION DATE & TIME: 9/17/2021  8:12 AM    PCP: Clark Seaman    ED PROVIDER: David Chao PA-C      Chief Complaint   Patient presents with     Back Pain         FINAL IMPRESSION:  1. Closed fracture of lumbar vertebra (H)          MEDICAL DECISION MAKING:    Pertinent Labs & Imaging studies reviewed. (See chart for details)  56 year old female presents to the Emergency Department for evaluation of mid low back pain after experiencing fall yesterday.    Based on the description of injury, exam findings plan to screen with a series of lumbar spine x-rays.  No negation for emergent MRI.    X-ray did reveal a mild compression fracture of L4.  I discussed this with the patient and recommended outpatient follow-up through her back and spine clinic where they may consider bracing.  I did discuss all the prescriptions that I wrote in the appropriate methods to use these.  I also provided her a work note for the next week so that she can rest and recover at home.  Overall patient comfortable with plan of care.        ED COURSE  8:55 AM   I met with the patient, obtained history, performed an initial exam, and discussed options and plan for diagnostics and treatment here in the ED.    At the conclusion of the encounter I discussed the results of all of the tests and the disposition. The questions were answered. The patient or family acknowledged understanding and was agreeable with the care plan.     MEDICATIONS GIVEN IN THE EMERGENCY:  Medications   ketorolac (TORADOL) injection 30 mg (30 mg Intramuscular Given 9/17/21 0933)       NEW PRESCRIPTIONS STARTED AT TODAY'S ER VISIT  New Prescriptions    CYCLOBENZAPRINE (FLEXERIL) 10 MG TABLET    Take 1 tablet (10 mg) by mouth 3 times daily as needed for muscle spasms    DOCUSATE SODIUM (COLACE) 50 MG CAPSULE    Take 1 capsule (50 mg) by mouth 2  times daily    OXYCODONE-ACETAMINOPHEN (PERCOCET) 5-325 MG TABLET    Take 1-2 tablets by mouth every 4 hours as needed for pain            =================================================================    HPI    Patient information was obtained from: patient    Use of Interpretor: N/A         Rosa Arreguin is a 56 year old female with a pertinent history of diabetes, COPD, and asthma who presents to this ED by walk in for evaluation of fall.    Patient fell yesterday when she went from outside to inside while she was going to work and tripped over a box she couldn't see. She fell forward but twisted and ended up landing on her buttocks. She is having back pain. She has not taken anything for her symptoms. Patient has a history of a bulging disc in her back that she had an injection for yesterday prior to her fall, but she never had back pain from her bulging disc, she only had pins and needles in her legs. Denies any other complaints at this time.    REVIEW OF SYSTEMS   Review of Systems   Respiratory: Negative for shortness of breath.    Cardiovascular: Negative for leg swelling.   Musculoskeletal: Positive for back pain.   Neurological: Negative for syncope.   All other systems reviewed and are negative.     PAST MEDICAL HISTORY:  Past Medical History:   Diagnosis Date     Asthma      COPD (chronic obstructive pulmonary disease) (H)      Diabetes mellitus (H)      Hypertension        PAST SURGICAL HISTORY:  Past Surgical History:   Procedure Laterality Date     C LAP,CHOLECYSTECTOMY/EXPLORE N/A 2021    Procedure: LAPAROSCOPIC CHOLECYSTECTOMY;  Surgeon: Wm Watson MD;  Location: [facility];  Service: General      SECTION       MOUTH SURGERY           CURRENT MEDICATIONS:    No current facility-administered medications for this encounter.    Current Outpatient Medications:      cyclobenzaprine (FLEXERIL) 10 MG tablet, Take 1 tablet (10 mg) by mouth 3 times daily as needed for muscle  spasms, Disp: 20 tablet, Rfl: 0     docusate sodium (COLACE) 50 MG capsule, Take 1 capsule (50 mg) by mouth 2 times daily, Disp: 30 capsule, Rfl: 0     oxyCODONE-acetaminophen (PERCOCET) 5-325 MG tablet, Take 1-2 tablets by mouth every 4 hours as needed for pain, Disp: 12 tablet, Rfl: 0     albuterol (PROAIR HFA/PROVENTIL HFA/VENTOLIN HFA) 108 (90 Base) MCG/ACT inhaler, INHALE TWO PUFFS BY MOUTH FOUR TIMES DAILY AS NEEDED FOR cough/wheeze/SHORTNESS OF BREATH, Disp: , Rfl:      DULERA 200-5 MCG/ACT inhaler, INHALE 2 PUFFS BY MOUTH TWICE DAILY, Disp: , Rfl:      gabapentin (NEURONTIN) 300 MG capsule, Take 300-600 mg by mouth At Bedtime, Disp: , Rfl:      losartan (COZAAR) 25 MG tablet, Take 1 tablet by mouth daily, Disp: , Rfl:      metFORMIN (GLUCOPHAGE) 500 MG tablet, Take 500 mg by mouth 2 times daily (with meals), Disp: , Rfl:      nicotine (NICODERM CQ) 21 MG/24HR 24 hr patch, as needed., Disp: , Rfl:      polyethylene glycol (MIRALAX) 17 g packet, as needed., Disp: , Rfl:      SPIRIVA RESPIMAT 2.5 MCG/ACT inhaler, Inhale 2 puffs daily., Disp: , Rfl:       ALLERGIES:  Allergies   Allergen Reactions     Amoxicillin Rash     Patient notes she had previously tolerated this until recently when she was prescribed a higher dose     Lisinopril Cough       FAMILY HISTORY:  Family History   Problem Relation Age of Onset     Breast Cancer Paternal Aunt      Breast Cancer Other         paternal cousin       SOCIAL HISTORY:   Social History     Socioeconomic History     Marital status: Single     Spouse name: Not on file     Number of children: Not on file     Years of education: Not on file     Highest education level: Not on file   Occupational History     Not on file   Tobacco Use     Smoking status: Former Smoker     Packs/day: 1.00     Years: 42.00     Pack years: 42.00     Quit date: 2020     Years since quittin.6     Smokeless tobacco: Never Used     Tobacco comment: 1-2 per day   Substance and Sexual  "Activity     Alcohol use: Yes     Comment: Alcoholic Drinks/day: occassionally     Drug use: Never     Sexual activity: Not on file   Other Topics Concern     Not on file   Social History Narrative     Not on file     Social Determinants of Health     Financial Resource Strain:      Difficulty of Paying Living Expenses:    Food Insecurity:      Worried About Running Out of Food in the Last Year:      Ran Out of Food in the Last Year:    Transportation Needs:      Lack of Transportation (Medical):      Lack of Transportation (Non-Medical):    Physical Activity:      Days of Exercise per Week:      Minutes of Exercise per Session:    Stress:      Feeling of Stress :    Social Connections:      Frequency of Communication with Friends and Family:      Frequency of Social Gatherings with Friends and Family:      Attends Shinto Services:      Active Member of Clubs or Organizations:      Attends Club or Organization Meetings:      Marital Status:    Intimate Partner Violence:      Fear of Current or Ex-Partner:      Emotionally Abused:      Physically Abused:      Sexually Abused:        VITALS:  Patient Vitals for the past 24 hrs:   BP Temp Temp src Pulse Resp SpO2 Height Weight   09/17/21 0814 (!) 148/80 98  F (36.7  C) Oral 103 22 97 % 1.575 m (5' 2\") 96.2 kg (212 lb)       PHYSICAL EXAM    Physical Exam  Vitals and nursing note reviewed.   Constitutional:       General: She is not in acute distress.     Appearance: She is obese. She is not toxic-appearing.   HENT:      Head: Normocephalic.      Right Ear: External ear normal.      Left Ear: External ear normal.   Eyes:      Pupils: Pupils are equal, round, and reactive to light.   Pulmonary:      Effort: Pulmonary effort is normal. No respiratory distress.   Musculoskeletal:         General: No signs of injury. Normal range of motion.      Cervical back: Normal range of motion.      Comments: Midline bony tenderness lower aspect of the lumbar spine.  Remainder of " spinal exam is benign.  No gross deformity or evidence of trauma to the posterior torso on skin examination.  Patient is ambulatory under her own power able to move arms and legs freely.   Skin:     General: Skin is warm and dry.   Neurological:      General: No focal deficit present.      Mental Status: She is alert and oriented to person, place, and time. Mental status is at baseline.      Motor: No weakness.      Gait: Gait normal.   Psychiatric:         Mood and Affect: Mood normal.         Behavior: Behavior normal.            RADIOLOGY:  Reviewed all pertinent imaging. Please see official radiology report.  Lumbar spine XR, 2-3 views   Final Result   IMPRESSION:       Nomenclature presumes 5 lumbar type vertebral bodies.      There is mild superior endplate height loss at L4, compatible with an age-indeterminate compression fracture. The remaining vertebral bodies are unremarkable in height.      There is minimal L2-L3 retrolisthesis. There is mild levoconvex curvature.      There is mild L5-S1 intervertebral disc height loss which may indicate underlying spondylosis.      Pedicles appear symmetric.      The imaged portion of the sacrum appears grossly intact. However, it is partially obscured by stool and bowel gas.          I, Elmer Gray, am serving as a scribe to document services personally performed by David Chao PA-C based on my observation and the provider's statements to me. IDavid PA-C attest that Elmer Gray is acting in a scribe capacity, has observed my performance of the services and has documented them in accordance with my direction.    David Chao PA-C  Emergency Medicine  [facility]     David Chao PA-C  09/17/21 102

## 2021-09-17 NOTE — ED TRIAGE NOTES
Tripped over a box yesterday landing on buttocks. Came here yesterday and LWBS. Pain not any better and has been using ice and ibuprofen without relief. Pain increases with movement

## 2021-09-17 NOTE — Clinical Note
oRsa Arreguin was seen and treated in our emergency department on 9/17/2021.  She may return to work on 09/24/2021.       If you have any questions or concerns, please don't hesitate to call.      David Chao PA-C

## 2021-10-01 PROBLEM — Z72.0 TOBACCO ABUSE: Status: ACTIVE | Noted: 2021-10-01

## 2021-10-01 PROBLEM — Z90.49 STATUS POST LAPAROSCOPIC CHOLECYSTECTOMY: Status: ACTIVE | Noted: 2021-01-04

## 2021-10-01 PROBLEM — I51.9 HEART DISEASE: Status: ACTIVE | Noted: 2021-10-01

## 2021-10-01 PROBLEM — G62.9 NEUROPATHY: Status: ACTIVE | Noted: 2021-10-01

## 2021-10-01 PROBLEM — E11.9 TYPE 2 DIABETES MELLITUS WITHOUT COMPLICATION (H): Status: ACTIVE | Noted: 2021-10-01

## 2021-10-01 PROBLEM — F32.9 MAJOR DEPRESSIVE DISORDER, SINGLE EPISODE: Status: ACTIVE | Noted: 2021-10-01

## 2021-10-01 PROBLEM — I10 ESSENTIAL HYPERTENSION: Status: ACTIVE | Noted: 2021-10-01

## 2021-10-01 PROBLEM — G25.81 RESTLESS LEGS SYNDROME: Status: ACTIVE | Noted: 2021-10-01

## 2021-10-01 PROBLEM — G57.10 MERALGIA PARESTHETICA: Status: ACTIVE | Noted: 2021-10-01

## 2021-10-01 PROBLEM — K59.00 CONSTIPATION: Status: ACTIVE | Noted: 2021-10-01

## 2021-10-01 PROBLEM — R91.1 PULMONARY NODULE: Status: ACTIVE | Noted: 2021-10-01

## 2021-10-01 PROBLEM — K80.20 CHOLELITHIASIS WITHOUT OBSTRUCTION: Status: ACTIVE | Noted: 2021-10-01

## 2021-10-10 ENCOUNTER — HEALTH MAINTENANCE LETTER (OUTPATIENT)
Age: 56
End: 2021-10-10

## 2021-11-17 ENCOUNTER — OFFICE VISIT (OUTPATIENT)
Dept: PULMONOLOGY | Facility: OTHER | Age: 56
End: 2021-11-17
Payer: COMMERCIAL

## 2021-11-17 VITALS
OXYGEN SATURATION: 98 % | HEART RATE: 74 BPM | SYSTOLIC BLOOD PRESSURE: 110 MMHG | DIASTOLIC BLOOD PRESSURE: 62 MMHG | WEIGHT: 212.6 LBS | BODY MASS INDEX: 38.89 KG/M2

## 2021-11-17 DIAGNOSIS — J44.89 CHRONIC OBSTRUCTIVE AIRWAY DISEASE WITH ASTHMA (H): ICD-10-CM

## 2021-11-17 DIAGNOSIS — Z72.0 TOBACCO ABUSE: Primary | ICD-10-CM

## 2021-11-17 DIAGNOSIS — E66.01 MORBID OBESITY (H): ICD-10-CM

## 2021-11-17 PROCEDURE — 99214 OFFICE O/P EST MOD 30 MIN: CPT | Performed by: INTERNAL MEDICINE

## 2021-11-17 NOTE — PATIENT INSTRUCTIONS
1. Please stop smoking!  2. Continue to wear your mask. You can buy KN95's as these have more structure and tend not to collapse into your mouth.

## 2021-11-17 NOTE — PROGRESS NOTES
CCx: Follow-up visit for asthma    HPI:   Rosa Arreguin is a 52-year-old female who presents to clinic the aforementioned chief complaint.  She continues to be compliant with Dulera and Spiriva as prescribed.  She has been experiencing increasing shortness of breath and states that she has begun smoking 4 to 5 cigarettes a day again.  She denies chest pain, chest tightness, fevers, chills and does use her albuterol inhaler for rescue a couple of times a day and find that this helps her symptoms.  She plans to quit smoking soon.  Of note she has received both her Covid-19 booster and her flu shot.    From prior visit:  She had been diagnosed with asthma approximately 20 years ago, and COPD approximately 15 years ago.  For quite some time, she was using only an albuterol inhaler and was doing quite well.  However because of worsening symptoms, patient was tried on several inhalers including Dulera and Symbicort.  She does not remember why she stopped taking Advair, she says she had a reaction to the Symbicort and stopped taking that.  Currently, she is taking Flovent, Spiriva, duo nebs, and albuterol inhaler.  She has had trouble going up steps for years.  States the past 3 months is gotten especially worse to the point that she has to take a break when she gets to the top of stairs.. She does not need any help with any of her activities of daily living.  However, she is found it harder to keep it with her job as a  subsequently taken several days off of work.  She usually comes down with a cold or bronchitis twice a year receives steroids during those times.  Humidity and bending over may seem to make her breathing worse.  She otherwise denies any other specific alleviating or aggravating factors.    Inhaler Regimen: Spiriva, dulera  Work History:  at [employer]  Hobbies: golfing  Pets: none  Tobacco Use: stopped 8/3, 40 years 1/2 ppd   Home Environment: [facility] daughter and  boyfriend    ROS:  Pertinent positives alluded to in the HPI.  Review of systems is negative.    PMH (unchanged from prior visit):  Abnormal Pap smear  Asthma  Tobacco abuse    PSH (unchanged from prior visit):  No past surgeries    SH (unchanged from prior visit):  Social History     Socioeconomic History     Marital status: Single     Spouse name: None     Number of children: None     Years of education: None     Highest education level: None   Occupational History     None   Social Needs     Financial resource strain: None     Food insecurity     Worry: None     Inability: None     Transportation needs     Medical: None     Non-medical: None   Tobacco Use     Smoking status: Former Smoker     Packs/day: 1.00     Years: 42.00     Pack years: 42.00     Quit date: 2020     Years since quittin.1     Smokeless tobacco: Never Used     Tobacco comment: 1-2 per day   Substance and Sexual Activity     Alcohol use: Yes     Comment: occassionally     Drug use: Never     Sexual activity: None   Lifestyle     Physical activity     Days per week: None     Minutes per session: None     Stress: None   Relationships     Social connections     Talks on phone: None     Gets together: None     Attends Denominational service: None     Active member of club or organization: None     Attends meetings of clubs or organizations: None     Relationship status: None     Intimate partner violence     Fear of current or ex partner: None     Emotionally abused: None     Physically abused: None     Forced sexual activity: None   Other Topics Concern     None   Social History Narrative     None     Family history (unchanged from prior visit):  Mother COPD      Current Meds:  Current Outpatient Medications   Medication Sig Dispense Refill     albuterol (PROAIR HFA/PROVENTIL HFA/VENTOLIN HFA) 108 (90 Base) MCG/ACT inhaler INHALE TWO PUFFS BY MOUTH FOUR TIMES DAILY AS NEEDED FOR cough/wheeze/SHORTNESS OF BREATH       atorvastatin (LIPITOR) 10  MG tablet Take 10 mg by mouth daily       docusate sodium (COLACE) 50 MG capsule Take 1 capsule (50 mg) by mouth 2 times daily 30 capsule 0     DULERA 200-5 MCG/ACT inhaler INHALE 2 PUFFS BY MOUTH TWICE DAILY       gabapentin (NEURONTIN) 300 MG capsule Take 300-600 mg by mouth At Bedtime       hydrochlorothiazide (HYDRODIURIL) 25 MG tablet Take 25 mg by mouth daily       ipratropium - albuterol 0.5 mg/2.5 mg/3 mL (DUONEB) 0.5-2.5 (3) MG/3ML neb solution 3 mL       losartan (COZAAR) 25 MG tablet Take 1 tablet by mouth daily       metFORMIN (GLUCOPHAGE) 500 MG tablet Take 500 mg by mouth 2 times daily (with meals)       nicotine (NICODERM CQ) 21 MG/24HR 24 hr patch as needed.       ondansetron (ZOFRAN-ODT) 4 MG ODT tab Take 4-8 mg by mouth       polyethylene glycol (MIRALAX) 17 g packet as needed.       SPIRIVA RESPIMAT 2.5 MCG/ACT inhaler Inhale 2 puffs daily.           Labs (unchanged from prior visit):  No results found for this or any previous visit (from the past 72 hour(s)).    I have personally reviewed all imaging and PFT data available pertinent to this visit.    Imaging studies (unchanged from prior visit):  CTA CHEST PE RUN  8/4/2017 11:18 AM     INDICATION: Shortness of breath  TECHNIQUE: Helical acquisition through the chest was performed during the arterial phase of contrast enhancement using IV contrast. 2D and 3D reconstructions were performed by the CT technologist. Dose reduction techniques were used.   IV CONTRAST: Iohexol (Omni) 100 mL  COMPARISON: None.     FINDINGS:  ANGIOGRAM CHEST: Negative for pulmonary emboli. Negative for thoracic aortic dissection and aneurysms.     LUNGS AND PLEURA: There is mild atelectasis in the inferior lingula. Lungs otherwise clear. No pulmonary nodules or masses. No pneumothorax or pleural effusion. No focal airspace pneumonia.     MEDIASTINUM: No mediastinal or hilar lymphadenopathy. There are small aorticopulmonary window lymph nodes. No pericardial effusion. No  coronary artery calcification. There is no axillary lymphadenopathy. The visualized thyroid is normal.     LIMITED UPPER ABDOMEN: There is an ovoid low-attenuation mass in the right adrenal gland measuring 1.8 x 0.8 cm. Given its attenuation this is most likely a benign adenoma.     MUSCULOSKELETAL: Negative.     IMPRESSION:   CONCLUSION:  1.  Pulmonary arteries are well-opacified and there is no evidence for pulmonary and was in either lung. No thoracic aortic aneurysm or thoracic aortic dissection.  2.  Atelectasis in the inferior lingula. Lungs are otherwise clear.  3.  Probable benign adenoma right adrenal gland.    PFTs (unchanged from prior visit):  FEV1/FVC is 48% and is reduced.  FEV1 is 40% predicted and is reduced.  FVC is 62% predicted and reduced.  There was improvement in spirometry after a single inhaled dose of bronchodilator.  TLC is 108% predicted and is normal.  RV is 176% predicted and is reduced.  DLCO is 84% predicted and is normal.    Impression:  Full Pulmonary Function Test is abnormal.  PFTs are consistent with severe  obstructive disease.  Bronchodilator response is consistent with reversibility.  There is no hyperinflation.  There is air-trapping.  Diffusion capacity when corrected for hemoglobin is normal.    /62   Pulse 74   Wt 96.4 kg (212 lb 9.6 oz)   SpO2 98%   BMI 38.89 kg/m    Physical Exam  Constitutional:       Appearance: Normal appearance.   HENT:      Head: Normocephalic and atraumatic.      Mouth/Throat:      Mouth: Mucous membranes are moist.   Cardiovascular:      Rate and Rhythm: Normal rate and regular rhythm.      Pulses: Normal pulses.      Heart sounds: Normal heart sounds.   Pulmonary:      Effort: Pulmonary effort is normal.      Breath sounds: Normal breath sounds.   Abdominal:      General: Abdomen is flat.   Skin:     General: Skin is warm.   Neurological:      General: No focal deficit present.      Mental Status: She is alert.       Assessment and  Plan:  1. Asthma COPD overlap syndrome: He is compliant with both Dulera and Spiriva and continues to have shortness of breath and unfortunately continues to smoke. Note she did have alpha-1 antitrypsin levels checked at the last visit and these were noted to be normal.    Continue Dulera inhaler twice a day and was reminded to gargle after using using this.      Continue Spiriva once a day.    Continue as needed albuterol for rescue.    Elevate head end of her bed when she sleeps.  2. HCM: Covid-19 and influenza vaccine.  3. Follow-up: 6 months.

## 2021-12-05 ENCOUNTER — HEALTH MAINTENANCE LETTER (OUTPATIENT)
Age: 56
End: 2021-12-05

## 2022-01-30 ENCOUNTER — HEALTH MAINTENANCE LETTER (OUTPATIENT)
Age: 57
End: 2022-01-30

## 2022-04-04 DIAGNOSIS — J44.89 ASTHMA-COPD OVERLAP SYNDROME (H): Primary | ICD-10-CM

## 2022-04-04 RX ORDER — ALBUTEROL SULFATE 90 UG/1
AEROSOL, METERED RESPIRATORY (INHALATION)
Qty: 18 G | Refills: 11 | Status: SHIPPED | OUTPATIENT
Start: 2022-04-04 | End: 2023-05-01

## 2022-04-04 RX ORDER — MOMETASONE FUROATE AND FORMOTEROL FUMARATE DIHYDRATE 200; 5 UG/1; UG/1
AEROSOL RESPIRATORY (INHALATION)
Qty: 13 G | Refills: 11 | Status: SHIPPED | OUTPATIENT
Start: 2022-04-04 | End: 2023-05-01

## 2022-05-19 ENCOUNTER — OFFICE VISIT (OUTPATIENT)
Dept: PULMONOLOGY | Facility: OTHER | Age: 57
End: 2022-05-19
Payer: COMMERCIAL

## 2022-05-19 VITALS
HEART RATE: 80 BPM | WEIGHT: 223 LBS | OXYGEN SATURATION: 95 % | DIASTOLIC BLOOD PRESSURE: 70 MMHG | SYSTOLIC BLOOD PRESSURE: 112 MMHG | BODY MASS INDEX: 40.79 KG/M2

## 2022-05-19 DIAGNOSIS — J44.9 CHRONIC OBSTRUCTIVE PULMONARY DISEASE, UNSPECIFIED COPD TYPE (H): ICD-10-CM

## 2022-05-19 DIAGNOSIS — Z87.891 PERSONAL HISTORY OF TOBACCO USE: Primary | ICD-10-CM

## 2022-05-19 PROCEDURE — 99214 OFFICE O/P EST MOD 30 MIN: CPT | Performed by: INTERNAL MEDICINE

## 2022-05-19 PROCEDURE — G0296 VISIT TO DETERM LDCT ELIG: HCPCS | Performed by: INTERNAL MEDICINE

## 2022-05-19 RX ORDER — IPRATROPIUM BROMIDE AND ALBUTEROL SULFATE 2.5; .5 MG/3ML; MG/3ML
1 SOLUTION RESPIRATORY (INHALATION) EVERY 6 HOURS PRN
Qty: 240 ML | Refills: 11 | Status: SHIPPED | OUTPATIENT
Start: 2022-05-19

## 2022-05-19 ASSESSMENT — ASTHMA QUESTIONNAIRES
ACT_TOTALSCORE: 7
QUESTION_1 LAST FOUR WEEKS HOW MUCH OF THE TIME DID YOUR ASTHMA KEEP YOU FROM GETTING AS MUCH DONE AT WORK, SCHOOL OR AT HOME: SOME OF THE TIME
ACT_TOTALSCORE: 7
QUESTION_4 LAST FOUR WEEKS HOW OFTEN HAVE YOU USED YOUR RESCUE INHALER OR NEBULIZER MEDICATION (SUCH AS ALBUTEROL): THREE OR MORE TIMES PER DAY
QUESTION_3 LAST FOUR WEEKS HOW OFTEN DID YOUR ASTHMA SYMPTOMS (WHEEZING, COUGHING, SHORTNESS OF BREATH, CHEST TIGHTNESS OR PAIN) WAKE YOU UP AT NIGHT OR EARLIER THAN USUAL IN THE MORNING: FOUR OR MORE NIGHTS A WEEK
QUESTION_2 LAST FOUR WEEKS HOW OFTEN HAVE YOU HAD SHORTNESS OF BREATH: MORE THAN ONCE A DAY
QUESTION_5 LAST FOUR WEEKS HOW WOULD YOU RATE YOUR ASTHMA CONTROL: NOT CONTROLLED AT ALL

## 2022-05-19 NOTE — PROGRESS NOTES
CCx: Follow-up visit for asthma    HPI:   Rosa Arreguin is a 52-year-old female who presents to clinic the aforementioned chief complaint.  She continues to be compliant with Dulera and Spiriva as prescribed.  She has been experiencing increasing shortness of breath and states that she has begun smoking 4 to 5 cigarettes a day again.  She denies chest pain, chest tightness, fevers, chills and does use her albuterol inhaler for rescue a couple of times a day and find that this helps her symptoms.  She plans to quit smoking soon.  Of note she has received both her Covid-19 booster and her flu shot.    From prior visit:  She had been diagnosed with asthma approximately 20 years ago, and COPD approximately 15 years ago.  For quite some time, she was using only an albuterol inhaler and was doing quite well.  However because of worsening symptoms, patient was tried on several inhalers including Dulera and Symbicort.  She does not remember why she stopped taking Advair, she says she had a reaction to the Symbicort and stopped taking that.  Currently, she is taking Flovent, Spiriva, duo nebs, and albuterol inhaler.  She has had trouble going up steps for years.  States the past 3 months is gotten especially worse to the point that she has to take a break when she gets to the top of stairs.. She does not need any help with any of her activities of daily living.  However, she is found it harder to keep it with her job as a  subsequently taken several days off of work.  She usually comes down with a cold or bronchitis twice a year receives steroids during those times.  Humidity and bending over may seem to make her breathing worse.  She otherwise denies any other specific alleviating or aggravating factors.    Inhaler Regimen: Spiriva, dulera  Work History:  at [employer]  Hobbies: golfing  Pets: none  Tobacco Use: stopped 8/3, 40 years 1/2 ppd   Home Environment: [facility] daughter and  boyfriend    ROS:  Pertinent positives alluded to in the HPI.  Review of systems is negative.    PMH (unchanged from prior visit):  Abnormal Pap smear  Asthma  Tobacco abuse    PSH (unchanged from prior visit):  No past surgeries    SH (unchanged from prior visit):  Social History     Socioeconomic History     Marital status: Single     Spouse name: None     Number of children: None     Years of education: None     Highest education level: None   Occupational History     None   Social Needs     Financial resource strain: None     Food insecurity     Worry: None     Inability: None     Transportation needs     Medical: None     Non-medical: None   Tobacco Use     Smoking status: Former Smoker     Packs/day: 1.00     Years: 42.00     Pack years: 42.00     Quit date: 2020     Years since quittin.1     Smokeless tobacco: Never Used     Tobacco comment: 1-2 per day   Substance and Sexual Activity     Alcohol use: Yes     Comment: occassionally     Drug use: Never     Sexual activity: None   Lifestyle     Physical activity     Days per week: None     Minutes per session: None     Stress: None   Relationships     Social connections     Talks on phone: None     Gets together: None     Attends Mu-ism service: None     Active member of club or organization: None     Attends meetings of clubs or organizations: None     Relationship status: None     Intimate partner violence     Fear of current or ex partner: None     Emotionally abused: None     Physically abused: None     Forced sexual activity: None   Other Topics Concern     None   Social History Narrative     None     Family history (unchanged from prior visit):  Mother COPD      Current Meds:  Current Outpatient Medications   Medication Sig Dispense Refill     albuterol (PROAIR HFA/PROVENTIL HFA/VENTOLIN HFA) 108 (90 Base) MCG/ACT inhaler INHALE TWO PUFFS BY MOUTH FOUR TIMES DAILY AS NEEDED FOR cough/wheeze/SHORTNESS OF BREATH 18 g 11     atorvastatin  (LIPITOR) 10 MG tablet Take 10 mg by mouth daily       DULERA 200-5 MCG/ACT inhaler INHALE 2 PUFFS BY MOUTH TWICE DAILY 13 g 11     gabapentin (NEURONTIN) 300 MG capsule Take 300-600 mg by mouth At Bedtime       hydrochlorothiazide (HYDRODIURIL) 25 MG tablet Take 25 mg by mouth daily       ipratropium - albuterol 0.5 mg/2.5 mg/3 mL (DUONEB) 0.5-2.5 (3) MG/3ML neb solution 3 mL       metFORMIN (GLUCOPHAGE) 500 MG tablet Take 500 mg by mouth 2 times daily (with meals)       nicotine (NICODERM CQ) 21 MG/24HR 24 hr patch as needed.       polyethylene glycol (MIRALAX) 17 g packet as needed.       SPIRIVA RESPIMAT 2.5 MCG/ACT inhaler Inhale 2 puffs daily.       docusate sodium (COLACE) 50 MG capsule Take 1 capsule (50 mg) by mouth 2 times daily 30 capsule 0     losartan (COZAAR) 25 MG tablet Take 1 tablet by mouth daily       ondansetron (ZOFRAN-ODT) 4 MG ODT tab Take 4-8 mg by mouth           Labs (unchanged from prior visit):  No results found for this or any previous visit (from the past 72 hour(s)).    I have personally reviewed all imaging and PFT data available pertinent to this visit.    Imaging studies (unchanged from prior visit):  CTA CHEST PE RUN  8/4/2017 11:18 AM     INDICATION: Shortness of breath  TECHNIQUE: Helical acquisition through the chest was performed during the arterial phase of contrast enhancement using IV contrast. 2D and 3D reconstructions were performed by the CT technologist. Dose reduction techniques were used.   IV CONTRAST: Iohexol (Omni) 100 mL  COMPARISON: None.     FINDINGS:  ANGIOGRAM CHEST: Negative for pulmonary emboli. Negative for thoracic aortic dissection and aneurysms.     LUNGS AND PLEURA: There is mild atelectasis in the inferior lingula. Lungs otherwise clear. No pulmonary nodules or masses. No pneumothorax or pleural effusion. No focal airspace pneumonia.     MEDIASTINUM: No mediastinal or hilar lymphadenopathy. There are small aorticopulmonary window lymph nodes. No  pericardial effusion. No coronary artery calcification. There is no axillary lymphadenopathy. The visualized thyroid is normal.     LIMITED UPPER ABDOMEN: There is an ovoid low-attenuation mass in the right adrenal gland measuring 1.8 x 0.8 cm. Given its attenuation this is most likely a benign adenoma.     MUSCULOSKELETAL: Negative.     IMPRESSION:   CONCLUSION:  1.  Pulmonary arteries are well-opacified and there is no evidence for pulmonary and was in either lung. No thoracic aortic aneurysm or thoracic aortic dissection.  2.  Atelectasis in the inferior lingula. Lungs are otherwise clear.  3.  Probable benign adenoma right adrenal gland.    PFTs (unchanged from prior visit):  FEV1/FVC is 48% and is reduced.  FEV1 is 40% predicted and is reduced.  FVC is 62% predicted and reduced.  There was improvement in spirometry after a single inhaled dose of bronchodilator.  TLC is 108% predicted and is normal.  RV is 176% predicted and is reduced.  DLCO is 84% predicted and is normal.    Impression:  Full Pulmonary Function Test is abnormal.  PFTs are consistent with severe  obstructive disease.  Bronchodilator response is consistent with reversibility.  There is no hyperinflation.  There is air-trapping.  Diffusion capacity when corrected for hemoglobin is normal.    /70 (BP Location: Left arm, Patient Position: Chair, Cuff Size: Adult Large)   Pulse 80   Wt 101.2 kg (223 lb)   SpO2 95%   BMI 40.79 kg/m    Physical Exam  Constitutional:       Appearance: Normal appearance.   HENT:      Head: Normocephalic and atraumatic.      Mouth/Throat:      Mouth: Mucous membranes are moist.   Cardiovascular:      Rate and Rhythm: Normal rate and regular rhythm.      Pulses: Normal pulses.      Heart sounds: Normal heart sounds.   Pulmonary:      Effort: Pulmonary effort is normal.      Breath sounds: Normal breath sounds.   Abdominal:      General: Abdomen is flat.   Skin:     General: Skin is warm.   Neurological:       General: No focal deficit present.      Mental Status: She is alert.       Assessment and Plan:  1. Asthma COPD overlap syndrome: He is compliant with both Dulera and Spiriva and continues to have shortness of breath and unfortunately continues to smoke. Note she did have alpha-1 antitrypsin levels checked at the last visit and these were noted to be normal.    Continue Dulera inhaler twice a day and was reminded to gargle after using using this.      Continue Spiriva once a day.    Continue as needed albuterol for rescue.    Elevate head end of her bed when she sleeps.  2. HCM: Covid-19 and influenza vaccine.  3. Follow-up: 6 months.      Lung Cancer Screening Shared Decision Making Visit     Rosa Arreguin, a 57 year old female, is eligible for lung cancer screening    History   Smoking Status     Former Smoker     Packs/day: 1.00     Years: 42.00   Smokeless Tobacco     Never Used     Comment: 1-2 per day   {TIP  Follow this link to update the tobacco history if needed :569269}    I have discussed with patient the risks and benefits of screening for lung cancer with low-dose CT.     The risks include:    radiation exposure: one low dose chest CT has as much ionizing radiation as about 15 chest x-rays, or 6 months of background radiation living in [place]      false positives: most findings/nodules are NOT cancer, but some might still require additional diagnostic evaluation, including biopsy    over-diagnosis: some slow growing cancers that might never have been clinically significant will be detected and treated unnecessarily     The benefit of early detection of lung cancer is contingent upon adherence to annual screening or more frequent follow up if indicated.     Furthermore, to benefit from screening, Rosa must be willing and able to undergo diagnostic procedures, if indicated. Although no specific guide is available for determining severity of comorbidities, it is reasonable to withhold screening in  patients who have greater mortality risk from other diseases.     We did discuss that the best way to prevent lung cancer is to not smoke.    Some patients may value a numeric estimation of lung cancer risk when evaluating if lung cancer screening is right for them, here is one calculator:    ShouldIScreen

## 2022-05-19 NOTE — PROGRESS NOTES
CCx: Follow-up visit for asthma    HPI:   Rosa Arreguin is a 52-year-old female who presents to clinic the aforementioned chief complaint.  She continues to be compliant with Dulera and Spiriva as prescribed, however, she has not been using her Dulera twice a day which she forgets to do this.  Her breathing has been more challenging and she has been using her albuterol 4-5 times a day for the last couple of months.  She did develop COVID about a month ago and is not sure if her breathing became worse after this or if it was like this at the get go.  She denies chest pain, chest tightness, PND, fevers or chills.  She is able to walk greater than 400 yards without having to catch her breath unless she has to walk fast which she does during the course of her working day as a .  Of note she did quit smoking on May 8.    From prior visit:  She had been diagnosed with asthma approximately 20 years ago, and COPD approximately 15 years ago.  For quite some time, she was using only an albuterol inhaler and was doing quite well.  However because of worsening symptoms, patient was tried on several inhalers including Dulera and Symbicort.  She does not remember why she stopped taking Advair, she says she had a reaction to the Symbicort and stopped taking that.  Currently, she is taking Flovent, Spiriva, duo nebs, and albuterol inhaler.  She has had trouble going up steps for years.  States the past 3 months is gotten especially worse to the point that she has to take a break when she gets to the top of stairs.. She does not need any help with any of her activities of daily living.  However, she is found it harder to keep it with her job as a  subsequently taken several days off of work.  She usually comes down with a cold or bronchitis twice a year receives steroids during those times.  Humidity and bending over may seem to make her breathing worse.  She otherwise denies any other specific alleviating or  aggravating factors.    Inhaler Regimen: Spiriva, dulera  Work History:  at [employer]  Hobbies: golfing  Pets: none  Tobacco Use: stopped 8/3, 40 years 1/2 ppd   Home Environment: oma barrientos daughter and boyfriend    ROS:  Pertinent positives alluded to in the HPI.  Review of systems is negative.    PMH (unchanged from prior visit):  Abnormal Pap smear  Asthma  Tobacco abuse    PSH (unchanged from prior visit):  No past surgeries    SH (unchanged from prior visit):  Social History     Socioeconomic History     Marital status: Single     Spouse name: None     Number of children: None     Years of education: None     Highest education level: None   Occupational History     None   Social Needs     Financial resource strain: None     Food insecurity     Worry: None     Inability: None     Transportation needs     Medical: None     Non-medical: None   Tobacco Use     Smoking status: Former Smoker     Packs/day: 1.00     Years: 42.00     Pack years: 42.00     Quit date: 2020     Years since quittin.1     Smokeless tobacco: Never Used     Tobacco comment: 1-2 per day   Substance and Sexual Activity     Alcohol use: Yes     Comment: occassionally     Drug use: Never     Sexual activity: None   Lifestyle     Physical activity     Days per week: None     Minutes per session: None     Stress: None   Relationships     Social connections     Talks on phone: None     Gets together: None     Attends Rastafarian service: None     Active member of club or organization: None     Attends meetings of clubs or organizations: None     Relationship status: None     Intimate partner violence     Fear of current or ex partner: None     Emotionally abused: None     Physically abused: None     Forced sexual activity: None   Other Topics Concern     None   Social History Narrative     None     Family history (unchanged from prior visit):  Mother COPD      Current Meds:  Current Outpatient Medications   Medication  Sig Dispense Refill     albuterol (PROAIR HFA/PROVENTIL HFA/VENTOLIN HFA) 108 (90 Base) MCG/ACT inhaler INHALE TWO PUFFS BY MOUTH FOUR TIMES DAILY AS NEEDED FOR cough/wheeze/SHORTNESS OF BREATH 18 g 11     atorvastatin (LIPITOR) 10 MG tablet Take 10 mg by mouth daily       DULERA 200-5 MCG/ACT inhaler INHALE 2 PUFFS BY MOUTH TWICE DAILY 13 g 11     gabapentin (NEURONTIN) 300 MG capsule Take 300-600 mg by mouth At Bedtime       hydrochlorothiazide (HYDRODIURIL) 25 MG tablet Take 25 mg by mouth daily       ipratropium - albuterol 0.5 mg/2.5 mg/3 mL (DUONEB) 0.5-2.5 (3) MG/3ML neb solution 3 mL       metFORMIN (GLUCOPHAGE) 500 MG tablet Take 500 mg by mouth 2 times daily (with meals)       nicotine (NICODERM CQ) 21 MG/24HR 24 hr patch as needed.       polyethylene glycol (MIRALAX) 17 g packet as needed.       SPIRIVA RESPIMAT 2.5 MCG/ACT inhaler Inhale 2 puffs daily.       docusate sodium (COLACE) 50 MG capsule Take 1 capsule (50 mg) by mouth 2 times daily 30 capsule 0     losartan (COZAAR) 25 MG tablet Take 1 tablet by mouth daily       ondansetron (ZOFRAN-ODT) 4 MG ODT tab Take 4-8 mg by mouth           Labs (unchanged from prior visit):  No results found for this or any previous visit (from the past 72 hour(s)).    I have personally reviewed all imaging and PFT data available pertinent to this visit.    Imaging studies (unchanged from prior visit):  CTA CHEST PE RUN  8/4/2017 11:18 AM     INDICATION: Shortness of breath  TECHNIQUE: Helical acquisition through the chest was performed during the arterial phase of contrast enhancement using IV contrast. 2D and 3D reconstructions were performed by the CT technologist. Dose reduction techniques were used.   IV CONTRAST: Iohexol (Omni) 100 mL  COMPARISON: None.     FINDINGS:  ANGIOGRAM CHEST: Negative for pulmonary emboli. Negative for thoracic aortic dissection and aneurysms.     LUNGS AND PLEURA: There is mild atelectasis in the inferior lingula. Lungs otherwise clear.  No pulmonary nodules or masses. No pneumothorax or pleural effusion. No focal airspace pneumonia.     MEDIASTINUM: No mediastinal or hilar lymphadenopathy. There are small aorticopulmonary window lymph nodes. No pericardial effusion. No coronary artery calcification. There is no axillary lymphadenopathy. The visualized thyroid is normal.     LIMITED UPPER ABDOMEN: There is an ovoid low-attenuation mass in the right adrenal gland measuring 1.8 x 0.8 cm. Given its attenuation this is most likely a benign adenoma.     MUSCULOSKELETAL: Negative.     IMPRESSION:   CONCLUSION:  1.  Pulmonary arteries are well-opacified and there is no evidence for pulmonary and was in either lung. No thoracic aortic aneurysm or thoracic aortic dissection.  2.  Atelectasis in the inferior lingula. Lungs are otherwise clear.  3.  Probable benign adenoma right adrenal gland.    PFTs (unchanged from prior visit):  FEV1/FVC is 48% and is reduced.  FEV1 is 40% predicted and is reduced.  FVC is 62% predicted and reduced.  There was improvement in spirometry after a single inhaled dose of bronchodilator.  TLC is 108% predicted and is normal.  RV is 176% predicted and is reduced.  DLCO is 84% predicted and is normal.    Impression:  Full Pulmonary Function Test is abnormal.  PFTs are consistent with severe  obstructive disease.  Bronchodilator response is consistent with reversibility.  There is no hyperinflation.  There is air-trapping.  Diffusion capacity when corrected for hemoglobin is normal.    /70 (BP Location: Left arm, Patient Position: Chair, Cuff Size: Adult Large)   Pulse 80   Wt 101.2 kg (223 lb)   SpO2 95%   BMI 40.79 kg/m    Physical Exam  Constitutional:       Appearance: Normal appearance.   HENT:      Head: Normocephalic and atraumatic.      Mouth/Throat:      Mouth: Mucous membranes are moist.   Cardiovascular:      Rate and Rhythm: Normal rate and regular rhythm.      Pulses: Normal pulses.      Heart sounds: Normal  heart sounds.   Pulmonary:      Effort: Pulmonary effort is normal.      Breath sounds: Normal breath sounds.   Abdominal:      General: Abdomen is flat.   Skin:     General: Skin is warm.   Neurological:      General: No focal deficit present.      Mental Status: She is alert.       Assessment and Plan:  1. Asthma COPD overlap syndrome: Has had variable compliance with Dulera and is using Spiriva as prescribed.  She continues to have significant shortness of breath likely exacerbated by her recent infection with COVID-19.  Note she did have alpha-1 antitrypsin levels checked at the last visit and these were noted to be normal.    Continue Dulera inhaler twice a day and was reminded to gargle after using using this.  Reminded her to use this twice a day.    Continue Spiriva once a day.    Continue as needed albuterol for rescue.    Elevate head end of her bed when she sleeps.    I have asked her to call us and I will couple of weeks and if she is still very short of breath I may consider switching her inhalers or else adding on azithromycin.  2. HCM: Covid-19 and influenza vaccine. Lung cancer screening as below.  3. Follow-up: 6 months.    Lung Cancer Screening Shared Decision Making Visit      Rosa Arreguin, a 57 year old female, is eligible for lung cancer screening           History   Smoking Status     Former Smoker     Packs/day: 1.00     Years: 42.00   Smokeless Tobacco     Never Used       Comment: 1-2 per day        I have discussed with patient the risks and benefits of screening for lung cancer with low-dose CT.      The risks include:     radiation exposure: one low dose chest CT has as much ionizing radiation as about 15 chest x-rays, or 6 months of background radiation living in [place]       false positives: most findings/nodules are NOT cancer, but some might still require additional diagnostic evaluation, including biopsy     over-diagnosis: some slow growing cancers that might never have been  clinically significant will be detected and treated unnecessarily      The benefit of early detection of lung cancer is contingent upon adherence to annual screening or more frequent follow up if indicated.      Furthermore, to benefit from screening, Rosa must be willing and able to undergo diagnostic procedures, if indicated. Although no specific guide is available for determining severity of comorbidities, it is reasonable to withhold screening in patients who have greater mortality risk from other diseases.      We did discuss that the best way to prevent lung cancer is to not smoke.     Some patients may value a numeric estimation of lung cancer risk when evaluating if lung cancer screening is right for them, here is one calculator:     ShouldIScreen    Fide Ambriz MD  Pulmonary and Critical Care  (p) [phone]

## 2022-05-19 NOTE — PATIENT INSTRUCTIONS
COPD is short for chronic obstructive pulmonary disease and is a condition in which air becomes stuck in your lungs.     The medications used to treat this condition help to relax your airways and help air escape your lungs.  Please use your Dulera inhaler twice a dayevery day whether or not you are short of breath, this is not a rescue inhaler so do not use this if you are acutely short of breath.  Please be sure to gargle your mouth after using your Dulera inhaler.  Please use your Spiriva once a day every day whether or not you are short of breath, this is not a rescue inhaler so do not use this if you are acutely short of breath.  Please use your albuterol inhaler 2 puffs up to 6 times a day for rescue. If you are not short of breath, you do not need to use this.      Lung Cancer Screening   Frequently Asked Questions  If you are at high-risk for lung cancer, getting screened with low-dose computed tomography (LDCT) every year can help save your life. This handout offers answers to some of the most common questions about lung cancer screening. If you have other questions, please call [phone] ([phone]).     What is it?  Lung cancer screening uses special X-ray technology to create an image of your lung tissue. The exam is quick and easy and takes less than 10 seconds. We don t give you any medicine or use any needles. You can eat before and after the exam. You don t need to change your clothes as long as the clothing on your chest doesn t contain metal. But, you do need to be able to hold your breath for at least 6 seconds during the exam.    What is the goal of lung cancer screening?  The goal of lung cancer screening is to save lives. Many times, lung cancer is not found until a person starts having physical symptoms. Lung cancer screening can help detect lung cancer in the earliest stages when it may be easier to treat.    Who should be screened for lung cancer?  We suggest lung cancer  screening for anyone who is at high-risk for lung cancer. You are in the high-risk group if you:     are between the ages of 55 and 79, and   have smoked at least 1 pack of cigarettes a day for 20 or more years, and   still smoke or have quit within the past 15 years.    However, if you have a new cough or shortness of breath, you should talk to your doctor before being screened.    Why does it matter if I have symptoms?  Certain symptoms can be a sign that you have a condition in your lungs that should be checked and treated by your doctor. These symptoms include fever, chest pain, a new or changing cough, shortness of breath that you have never felt before, coughing up blood or unexplained weight loss. Having any of these symptoms can greatly affect the results of lung cancer screening.       Should all smokers get an LDCT lung cancer screening exam?  It depends. Lung cancer screening is for a very specific group of men and women who have a history of heavy smoking over a long period of time (see  Who should be screened for lung cancer  above).  I am in the high-risk group, but have been diagnosed with cancer in the past. Is LDCT lung cancer screening right for me?  In some cases, you should not have LDCT lung screening, such as when your doctor is already following your cancer with CT scan studies. Your doctor will help you decide if LDCT lung screening is right for you.  Do I need to have a screening exam every year?  Yes. If you are in the high-risk group described earlier, you should get an LDCT lung cancer screening exam every year until you are 79, or are no longer willing or able to undergo screening and possible procedures to diagnose and treat lung cancer.  How effective is LDCT at preventing death from lung cancer?  Studies have shown that LDCT lung cancer screening can lower the risk of death from lung cancer by 20 percent in people who are at high-risk.  What are the risks?  There are some risks and  limitations of LDCT lung cancer screening. We want to make sure you understand the risks and benefits, so please let us know if you have any questions. Your doctor may want to talk with you more about these risks.   Radiation exposure: As with any exam that uses radiation, there is a very small increased risk of cancer. The amount of radiation in LDCT is small--about the same amount a person would get from a mammogram. Your doctor orders the exam when he or she feels the potential benefits outweigh the risks.   False negatives: No test is perfect, including LDCT. It is possible that you may have a medical condition, including lung cancer, that is not found during your exam. This is called a false negative result.   False positives and more testing: LDCT very often finds something in the lung that could be cancer, but in fact is not. This is called a false positive result. False positive tests often cause anxiety. To make sure these findings are not cancer, you may need to have more tests. These tests will be done only if you give us permission. Sometimes patients need a treatment that can have side effects, such as a biopsy. For more information on false positives, see  What can I expect from the results?    Findings not related to lung cancer: Your LDCT exam also takes pictures of areas of your body next to your lungs. In a very small number of cases, the CT scan will show an abnormal finding in one of these areas, such as your kidneys, adrenal glands, liver or thyroid. This finding may not be serious, but you may need more tests. Your doctor can help you decide what other tests you may need, if any.  What can I expect from the results?  About 1 out of 4 LDCT exams will find something that may need more tests. Most of the time, these findings are lung nodules. Lung nodules are very small collections of tissue in the lung. These nodules are very common, and the vast majority--more than 97 percent--are not cancer  (benign). Most are normal lymph nodes or small areas of scarring from past infections.  But, if a small lung nodule is found to be cancer, the cancer can be cured more than 90 percent of the time. To know if the nodule is cancer, we may need to get more images before your next yearly screening exam. If the nodule has suspicious features (for example, it is large, has an odd shape or grows over time), we will refer you to a specialist for further testing.  Will my doctor also get the results?  Yes. Your doctor will get a copy of your results.  Is it okay to keep smoking now that there s a cancer screening exam?  No. Tobacco is one of the strongest cancer-causing agents. It causes not only lung cancer, but other cancers and cardiovascular (heart) diseases as well. The damage caused by smoking builds over time. This means that the longer you smoke, the higher your risk of disease. While it is never too late to quit, the sooner you quit, the better.  Where can I find help to quit smoking?  The best way to prevent lung cancer is to stop smoking. If you have already quit smoking, congratulations and keep it up! For help on quitting smoking, please call [facility] at 3-073-QUITNOW (1-498.780.1903) or the American [employer] Society at 1-523.215.7416 to find local resources near you.  One-on-one health coaching:  If you d prefer to work individually with a health care provider on tobacco cessation, we offer:     Medication Therapy Management:  Our specially trained pharmacists work closely with you and your doctor to help you quit smoking.  Call [phone] or [phone] (toll free).

## 2022-05-22 ENCOUNTER — HEALTH MAINTENANCE LETTER (OUTPATIENT)
Age: 57
End: 2022-05-22

## 2022-06-03 ENCOUNTER — HOSPITAL ENCOUNTER (OUTPATIENT)
Dept: CT IMAGING | Facility: CLINIC | Age: 57
Discharge: HOME | End: 2022-06-03
Attending: INTERNAL MEDICINE | Admitting: INTERNAL MEDICINE
Payer: COMMERCIAL

## 2022-06-03 DIAGNOSIS — J44.9 COPD (CHRONIC OBSTRUCTIVE PULMONARY DISEASE) (H): Primary | ICD-10-CM

## 2022-06-03 DIAGNOSIS — Z87.891 PERSONAL HISTORY OF TOBACCO USE: ICD-10-CM

## 2022-06-03 PROCEDURE — 71271 CT THORAX LUNG CANCER SCR C-: CPT

## 2022-06-03 RX ORDER — TIOTROPIUM BROMIDE INHALATION SPRAY 3.12 UG/1
SPRAY, METERED RESPIRATORY (INHALATION)
Qty: 4 G | Refills: 11 | Status: SHIPPED | OUTPATIENT
Start: 2022-06-03 | End: 2023-05-30

## 2022-06-03 RX ORDER — IOPAMIDOL 755 MG/ML
100 INJECTION, SOLUTION INTRAVASCULAR ONCE
Status: DISCONTINUED | OUTPATIENT
Start: 2022-06-03 | End: 2022-06-04 | Stop reason: HOSPADM

## 2022-08-04 ENCOUNTER — APPOINTMENT (OUTPATIENT)
Dept: CT IMAGING | Facility: CLINIC | Age: 57
End: 2022-08-04
Attending: EMERGENCY MEDICINE
Payer: COMMERCIAL

## 2022-08-04 ENCOUNTER — HOSPITAL ENCOUNTER (OUTPATIENT)
Facility: CLINIC | Age: 57
Setting detail: OBSERVATION
Discharge: HOME | End: 2022-08-04
Attending: EMERGENCY MEDICINE | Admitting: EMERGENCY MEDICINE
Payer: COMMERCIAL

## 2022-08-04 ENCOUNTER — APPOINTMENT (OUTPATIENT)
Dept: GENERAL RADIOLOGY | Facility: CLINIC | Age: 57
End: 2022-08-04
Attending: EMERGENCY MEDICINE
Payer: COMMERCIAL

## 2022-08-04 VITALS
DIASTOLIC BLOOD PRESSURE: 80 MMHG | SYSTOLIC BLOOD PRESSURE: 141 MMHG | BODY MASS INDEX: 40.48 KG/M2 | RESPIRATION RATE: 18 BRPM | WEIGHT: 220 LBS | HEART RATE: 93 BPM | TEMPERATURE: 98.2 F | OXYGEN SATURATION: 94 % | HEIGHT: 62 IN

## 2022-08-04 DIAGNOSIS — S20.221A BACK CONTUSION, RIGHT, INITIAL ENCOUNTER: ICD-10-CM

## 2022-08-04 DIAGNOSIS — S82.892A ANKLE FRACTURE, LEFT, CLOSED, INITIAL ENCOUNTER: ICD-10-CM

## 2022-08-04 DIAGNOSIS — S80.212A KNEE ABRASION, LEFT, INITIAL ENCOUNTER: ICD-10-CM

## 2022-08-04 DIAGNOSIS — E87.6 HYPOKALEMIA: ICD-10-CM

## 2022-08-04 DIAGNOSIS — S93.104A: ICD-10-CM

## 2022-08-04 LAB
ALBUMIN SERPL BCG-MCNC: 4.2 G/DL (ref 3.5–5.2)
ALP SERPL-CCNC: 123 U/L (ref 35–104)
ALT SERPL W P-5'-P-CCNC: 22 U/L (ref 10–35)
ANION GAP SERPL CALCULATED.3IONS-SCNC: 10 MMOL/L (ref 7–15)
AST SERPL W P-5'-P-CCNC: 35 U/L (ref 10–35)
BASOPHILS # BLD AUTO: 0.1 10E3/UL (ref 0–0.2)
BASOPHILS NFR BLD AUTO: 0 %
BILIRUB SERPL-MCNC: 0.2 MG/DL
BUN SERPL-MCNC: 10.8 MG/DL (ref 6–20)
CALCIUM SERPL-MCNC: 9.2 MG/DL (ref 8.6–10)
CHLORIDE SERPL-SCNC: 95 MMOL/L (ref 98–107)
CREAT SERPL-MCNC: 0.67 MG/DL (ref 0.51–0.95)
DEPRECATED HCO3 PLAS-SCNC: 28 MMOL/L (ref 22–29)
EOSINOPHIL # BLD AUTO: 0.3 10E3/UL (ref 0–0.7)
EOSINOPHIL NFR BLD AUTO: 2 %
ERYTHROCYTE [DISTWIDTH] IN BLOOD BY AUTOMATED COUNT: 13.2 % (ref 10–15)
ETHANOL SERPL-MCNC: <0.01 G/DL
GFR SERPL CREATININE-BSD FRML MDRD: >90 ML/MIN/1.73M2
GLUCOSE SERPL-MCNC: 158 MG/DL (ref 70–99)
HCT VFR BLD AUTO: 37.9 % (ref 35–47)
HGB BLD-MCNC: 12 G/DL (ref 11.7–15.7)
IMM GRANULOCYTES # BLD: 0.2 10E3/UL
IMM GRANULOCYTES NFR BLD: 1 %
LIPASE SERPL-CCNC: 48 U/L (ref 13–60)
LYMPHOCYTES # BLD AUTO: 3.8 10E3/UL (ref 0.8–5.3)
LYMPHOCYTES NFR BLD AUTO: 27 %
MCH RBC QN AUTO: 29.3 PG (ref 26.5–33)
MCHC RBC AUTO-ENTMCNC: 31.7 G/DL (ref 31.5–36.5)
MCV RBC AUTO: 92 FL (ref 78–100)
MONOCYTES # BLD AUTO: 1 10E3/UL (ref 0–1.3)
MONOCYTES NFR BLD AUTO: 7 %
NEUTROPHILS # BLD AUTO: 9 10E3/UL (ref 1.6–8.3)
NEUTROPHILS NFR BLD AUTO: 63 %
NRBC # BLD AUTO: 0 10E3/UL
NRBC BLD AUTO-RTO: 0 /100
PLATELET # BLD AUTO: 408 10E3/UL (ref 150–450)
POTASSIUM SERPL-SCNC: 3.2 MMOL/L (ref 3.4–5.3)
PROT SERPL-MCNC: 7.1 G/DL (ref 6.4–8.3)
RBC # BLD AUTO: 4.1 10E6/UL (ref 3.8–5.2)
SODIUM SERPL-SCNC: 133 MMOL/L (ref 136–145)
WBC # BLD AUTO: 14.3 10E3/UL (ref 4–11)

## 2022-08-04 PROCEDURE — 99285 EMERGENCY DEPT VISIT HI MDM: CPT | Mod: 25

## 2022-08-04 PROCEDURE — 27762 CLTX MED ANKLE FX W/MNPJ: CPT | Mod: LT

## 2022-08-04 PROCEDURE — 80053 COMPREHEN METABOLIC PANEL: CPT | Performed by: EMERGENCY MEDICINE

## 2022-08-04 PROCEDURE — 96375 TX/PRO/DX INJ NEW DRUG ADDON: CPT | Mod: XU

## 2022-08-04 PROCEDURE — 36415 COLL VENOUS BLD VENIPUNCTURE: CPT | Performed by: EMERGENCY MEDICINE

## 2022-08-04 PROCEDURE — 82040 ASSAY OF SERUM ALBUMIN: CPT | Performed by: EMERGENCY MEDICINE

## 2022-08-04 PROCEDURE — 96361 HYDRATE IV INFUSION ADD-ON: CPT

## 2022-08-04 PROCEDURE — 250N000011 HC RX IP 250 OP 636: Performed by: EMERGENCY MEDICINE

## 2022-08-04 PROCEDURE — 96374 THER/PROPH/DIAG INJ IV PUSH: CPT

## 2022-08-04 PROCEDURE — 85025 COMPLETE CBC W/AUTO DIFF WBC: CPT | Performed by: EMERGENCY MEDICINE

## 2022-08-04 PROCEDURE — 73610 X-RAY EXAM OF ANKLE: CPT | Mod: 50

## 2022-08-04 PROCEDURE — 74177 CT ABD & PELVIS W/CONTRAST: CPT

## 2022-08-04 PROCEDURE — 73630 X-RAY EXAM OF FOOT: CPT | Mod: 50

## 2022-08-04 PROCEDURE — 82077 ASSAY SPEC XCP UR&BREATH IA: CPT | Performed by: EMERGENCY MEDICINE

## 2022-08-04 PROCEDURE — 83690 ASSAY OF LIPASE: CPT | Performed by: EMERGENCY MEDICINE

## 2022-08-04 PROCEDURE — 250N000013 HC RX MED GY IP 250 OP 250 PS 637: Performed by: EMERGENCY MEDICINE

## 2022-08-04 PROCEDURE — G0378 HOSPITAL OBSERVATION PER HR: HCPCS

## 2022-08-04 PROCEDURE — 258N000003 HC RX IP 258 OP 636: Performed by: EMERGENCY MEDICINE

## 2022-08-04 PROCEDURE — 250N000009 HC RX 250: Performed by: EMERGENCY MEDICINE

## 2022-08-04 RX ORDER — ONDANSETRON 2 MG/ML
4 INJECTION INTRAMUSCULAR; INTRAVENOUS
Status: DISCONTINUED | OUTPATIENT
Start: 2022-08-04 | End: 2022-08-04 | Stop reason: HOSPADM

## 2022-08-04 RX ORDER — HYDROMORPHONE HYDROCHLORIDE 1 MG/ML
0.5 INJECTION, SOLUTION INTRAMUSCULAR; INTRAVENOUS; SUBCUTANEOUS
Status: COMPLETED | OUTPATIENT
Start: 2022-08-04 | End: 2022-08-04

## 2022-08-04 RX ORDER — OXYCODONE AND ACETAMINOPHEN 5; 325 MG/1; MG/1
2 TABLET ORAL ONCE
Status: COMPLETED | OUTPATIENT
Start: 2022-08-04 | End: 2022-08-04

## 2022-08-04 RX ORDER — POTASSIUM CHLORIDE 1.5 G/1.58G
40 POWDER, FOR SOLUTION ORAL ONCE
Status: COMPLETED | OUTPATIENT
Start: 2022-08-04 | End: 2022-08-04

## 2022-08-04 RX ORDER — HYDROCODONE BITARTRATE AND ACETAMINOPHEN 5; 325 MG/1; MG/1
1 TABLET ORAL EVERY 6 HOURS PRN
Qty: 15 TABLET | Refills: 0 | Status: SHIPPED | OUTPATIENT
Start: 2022-08-04 | End: 2022-08-31

## 2022-08-04 RX ORDER — IOPAMIDOL 755 MG/ML
500 INJECTION, SOLUTION INTRAVASCULAR ONCE
Status: COMPLETED | OUTPATIENT
Start: 2022-08-04 | End: 2022-08-04

## 2022-08-04 RX ADMIN — HYDROMORPHONE HYDROCHLORIDE 0.5 MG: 1 INJECTION, SOLUTION INTRAMUSCULAR; INTRAVENOUS; SUBCUTANEOUS at 15:09

## 2022-08-04 RX ADMIN — OXYCODONE HYDROCHLORIDE AND ACETAMINOPHEN 2 TABLET: 5; 325 TABLET ORAL at 17:11

## 2022-08-04 RX ADMIN — SODIUM CHLORIDE 1000 ML: 9 INJECTION, SOLUTION INTRAVENOUS at 15:08

## 2022-08-04 RX ADMIN — POTASSIUM CHLORIDE FOR ORAL SOLUTION 40 MEQ: 1.5 POWDER, FOR SOLUTION ORAL at 19:01

## 2022-08-04 RX ADMIN — IOPAMIDOL 90 ML: 755 INJECTION, SOLUTION INTRAVENOUS at 15:50

## 2022-08-04 RX ADMIN — SODIUM CHLORIDE 65 ML: 9 INJECTION, SOLUTION INTRAVENOUS at 15:50

## 2022-08-04 RX ADMIN — HYDROMORPHONE HYDROCHLORIDE 0.5 MG: 1 INJECTION, SOLUTION INTRAMUSCULAR; INTRAVENOUS; SUBCUTANEOUS at 14:46

## 2022-08-04 RX ADMIN — ONDANSETRON 4 MG: 2 INJECTION INTRAMUSCULAR; INTRAVENOUS at 15:10

## 2022-08-04 NOTE — DISCHARGE INSTRUCTIONS
Do not put any weight on the left leg until instructed by orthopedic surgery.  Please wear the boot at all times except for bathing, changing or sleeping.

## 2022-08-04 NOTE — ED NOTES
Bed: HW  Expected date:   Expected time:   Means of arrival:   Comments:  3EDRN's and 1 Boarder @ 3
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
## 2022-08-04 NOTE — ED NOTES
Patient was changed over to me with tentative plans of transfer to observation bed as patient was having difficulty tolerating Ortho boot and ambulation with crutches.  She was unable to safely mobilize and plan was for observation stay.  Ultimately patient was able to tolerate Ortho boot and ambulate with crutches.  Patient will be discharged home with analgesics, close follow-up with orthopedic surgery and return to ED for any worsening symptoms.     Brock Rosales MD  08/04/22 2822

## 2022-08-17 NOTE — PROGRESS NOTES
COVID-19 PCR test completed. Patient handout For Patients Who Have Been Tested for Covid-19 (Coronavirus) was given to the patient, which includes test result notification process.   70

## 2022-08-31 ENCOUNTER — OFFICE VISIT (OUTPATIENT)
Dept: PULMONOLOGY | Facility: OTHER | Age: 57
End: 2022-08-31
Payer: COMMERCIAL

## 2022-08-31 VITALS
DIASTOLIC BLOOD PRESSURE: 86 MMHG | BODY MASS INDEX: 40.81 KG/M2 | SYSTOLIC BLOOD PRESSURE: 133 MMHG | WEIGHT: 223.1 LBS | OXYGEN SATURATION: 94 % | HEART RATE: 86 BPM

## 2022-08-31 DIAGNOSIS — Z72.0 TOBACCO ABUSE: Primary | ICD-10-CM

## 2022-08-31 PROCEDURE — 99214 OFFICE O/P EST MOD 30 MIN: CPT | Performed by: INTERNAL MEDICINE

## 2022-08-31 RX ORDER — BUPROPION HYDROCHLORIDE 150 MG/1
TABLET ORAL
Qty: 183 TABLET | Refills: 0 | Status: SHIPPED | OUTPATIENT
Start: 2022-08-31 | End: 2022-11-29

## 2022-08-31 ASSESSMENT — ASTHMA QUESTIONNAIRES
QUESTION_4 LAST FOUR WEEKS HOW OFTEN HAVE YOU USED YOUR RESCUE INHALER OR NEBULIZER MEDICATION (SUCH AS ALBUTEROL): ONE OR TWO TIMES PER DAY
QUESTION_3 LAST FOUR WEEKS HOW OFTEN DID YOUR ASTHMA SYMPTOMS (WHEEZING, COUGHING, SHORTNESS OF BREATH, CHEST TIGHTNESS OR PAIN) WAKE YOU UP AT NIGHT OR EARLIER THAN USUAL IN THE MORNING: NOT AT ALL
ACT_TOTALSCORE: 15
QUESTION_2 LAST FOUR WEEKS HOW OFTEN HAVE YOU HAD SHORTNESS OF BREATH: MORE THAN ONCE A DAY
QUESTION_1 LAST FOUR WEEKS HOW MUCH OF THE TIME DID YOUR ASTHMA KEEP YOU FROM GETTING AS MUCH DONE AT WORK, SCHOOL OR AT HOME: NONE OF THE TIME
ACT_TOTALSCORE: 15
QUESTION_5 LAST FOUR WEEKS HOW WOULD YOU RATE YOUR ASTHMA CONTROL: POORLY CONTROLLED
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
## 2022-08-31 NOTE — PATIENT INSTRUCTIONS
- Pick a quit date and start the Wellbutrin about 1 week before that date. You will initially take 1 tab daily for 3 days, then increase to 1 tab twice daily.     If you have worsening symptoms, questions, or need to speak with the nurse please call [phone].    Colleen Toscano, CNP  Pulmonary Medicine  [facility]   [phone]

## 2022-09-24 ENCOUNTER — HEALTH MAINTENANCE LETTER (OUTPATIENT)
Age: 57
End: 2022-09-24

## 2022-11-29 DIAGNOSIS — Z72.0 TOBACCO ABUSE: ICD-10-CM

## 2022-11-29 RX ORDER — BUPROPION HYDROCHLORIDE 150 MG/1
TABLET ORAL
Qty: 183 TABLET | Refills: 0 | Status: SHIPPED | OUTPATIENT
Start: 2022-11-29 | End: 2023-03-15

## 2023-05-01 DIAGNOSIS — J44.89 ASTHMA-COPD OVERLAP SYNDROME (H): ICD-10-CM

## 2023-05-01 RX ORDER — ALBUTEROL SULFATE 90 UG/1
AEROSOL, METERED RESPIRATORY (INHALATION)
Qty: 18 G | Refills: 11 | Status: SHIPPED | OUTPATIENT
Start: 2023-05-01

## 2023-05-01 RX ORDER — MOMETASONE FUROATE AND FORMOTEROL FUMARATE DIHYDRATE 200; 5 UG/1; UG/1
AEROSOL RESPIRATORY (INHALATION)
Qty: 13 G | Refills: 11 | Status: SHIPPED | OUTPATIENT
Start: 2023-05-01

## 2023-05-08 ENCOUNTER — HEALTH MAINTENANCE LETTER (OUTPATIENT)
Age: 58
End: 2023-05-08

## 2023-05-30 DIAGNOSIS — J44.9 COPD (CHRONIC OBSTRUCTIVE PULMONARY DISEASE) (H): ICD-10-CM

## 2023-05-30 RX ORDER — TIOTROPIUM BROMIDE INHALATION SPRAY 3.12 UG/1
SPRAY, METERED RESPIRATORY (INHALATION)
Qty: 4 G | Refills: 11 | Status: SHIPPED | OUTPATIENT
Start: 2023-05-30

## 2023-10-08 ENCOUNTER — HEALTH MAINTENANCE LETTER (OUTPATIENT)
Age: 58
End: 2023-10-08

## 2023-12-17 ENCOUNTER — HEALTH MAINTENANCE LETTER (OUTPATIENT)
Age: 58
End: 2023-12-17

## 2024-02-25 ENCOUNTER — HEALTH MAINTENANCE LETTER (OUTPATIENT)
Age: 59
End: 2024-02-25

## 2024-05-10 NOTE — TELEPHONE ENCOUNTER
[facility] Call Center    Phone Message    May a detailed message be left on voicemail: yes     Reason for Call: Medication Refill Request    Has the patient contacted the pharmacy for the refill? Yes   Name of medication being requested:   Need Refills approved until follow up in August   DULERA 200-5 MCG/ACT inhale     albuterol (PROAIR HFA/PROVENTIL HFA/VENTOLIN HFA) 108 (90 Base) MCG/ACT inhaler   Provider who prescribed the medication: Dr. Ambriz     Pharmacy:   [id number] [address]     Date medication is needed: asap          Action Taken: Other: PULM    Travel Screening: Not Applicable

## 2024-05-14 ENCOUNTER — TELEPHONE (OUTPATIENT)
Dept: PULMONOLOGY | Facility: CLINIC | Age: 59
End: 2024-05-14

## 2024-07-14 ENCOUNTER — HEALTH MAINTENANCE LETTER (OUTPATIENT)
Age: 59
End: 2024-07-14

## 2024-08-27 ENCOUNTER — OFFICE VISIT (OUTPATIENT)
Dept: PULMONOLOGY | Facility: CLINIC | Age: 59
End: 2024-08-27
Payer: COMMERCIAL

## 2024-08-27 VITALS
BODY MASS INDEX: 36.21 KG/M2 | HEART RATE: 72 BPM | WEIGHT: 198 LBS | SYSTOLIC BLOOD PRESSURE: 136 MMHG | DIASTOLIC BLOOD PRESSURE: 78 MMHG | OXYGEN SATURATION: 96 %

## 2024-08-27 DIAGNOSIS — J44.9 CHRONIC OBSTRUCTIVE PULMONARY DISEASE, UNSPECIFIED COPD TYPE (H): ICD-10-CM

## 2024-08-27 DIAGNOSIS — F17.201 TOBACCO ABUSE, IN REMISSION: ICD-10-CM

## 2024-08-27 DIAGNOSIS — F17.201 MILD TOBACCO ABUSE IN EARLY REMISSION: Primary | ICD-10-CM

## 2024-08-27 DIAGNOSIS — J44.89 ASTHMA-COPD OVERLAP SYNDROME (H): ICD-10-CM

## 2024-08-27 PROCEDURE — 99214 OFFICE O/P EST MOD 30 MIN: CPT | Performed by: INTERNAL MEDICINE

## 2024-08-27 ASSESSMENT — ASTHMA QUESTIONNAIRES
QUESTION_2 LAST FOUR WEEKS HOW OFTEN HAVE YOU HAD SHORTNESS OF BREATH: MORE THAN ONCE A DAY
ACT_TOTALSCORE: 16
QUESTION_5 LAST FOUR WEEKS HOW WOULD YOU RATE YOUR ASTHMA CONTROL: SOMEWHAT CONTROLLED
QUESTION_4 LAST FOUR WEEKS HOW OFTEN HAVE YOU USED YOUR RESCUE INHALER OR NEBULIZER MEDICATION (SUCH AS ALBUTEROL): ONE OR TWO TIMES PER DAY
ACT_TOTALSCORE: 16
QUESTION_1 LAST FOUR WEEKS HOW MUCH OF THE TIME DID YOUR ASTHMA KEEP YOU FROM GETTING AS MUCH DONE AT WORK, SCHOOL OR AT HOME: NONE OF THE TIME
QUESTION_3 LAST FOUR WEEKS HOW OFTEN DID YOUR ASTHMA SYMPTOMS (WHEEZING, COUGHING, SHORTNESS OF BREATH, CHEST TIGHTNESS OR PAIN) WAKE YOU UP AT NIGHT OR EARLIER THAN USUAL IN THE MORNING: NOT AT ALL

## 2024-08-27 NOTE — PROGRESS NOTES
CCx: Follow-up visit for asthma/COPD    HPI:Rosa Arreguin is a 52-year-old female who presents to clinic the aforementioned chief complaint.  Since her last visit with me almost 2 years ago she states that she has quit smoking a couple of months ago and has lost a little bit of weight as she has initiated treatment with Ozempic.  She continues to endorse dyspnea on exertion with climbing stairs, bending over and lifting objects particularly when she is at work but has no difficulty when she is walking at a slow pace on level ground.  She is using Dulera only once a day she forgets to use it a second time a day and uses Spiriva once a day also.  She very rarely needs to use her Ventolin once a day.  She denies chest pain, chest tightness and has very few episodes of wheezing.    From prior visit:  She had been diagnosed with asthma approximately 20 years ago, and COPD approximately 15 years ago.  For quite some time, she was using only an albuterol inhaler and was doing quite well.  However because of worsening symptoms, patient was tried on several inhalers including Dulera and Symbicort.  She does not remember why she stopped taking Advair, she says she had a reaction to the Symbicort and stopped taking that. She does not need any help with any of her activities of daily living.  However, she is found it harder to keep it with her job as a  subsequently taken several days off of work.  She usually comes down with a cold or bronchitis twice a year receives steroids during those times.  Humidity and bending over may seem to make her breathing worse.  She otherwise denies any other specific alleviating or aggravating factors.          5/19/2022     1:00 PM 8/31/2022     2:00 PM 8/27/2024     1:33 PM   ACT Total Scores   ACT TOTAL SCORE (Goal Greater than or Equal to 20) 7 15 16   In the past 12 months, how many times did you visit the emergency room for your asthma without being admitted to the hospital? 0  0 0   In the past 12 months, how many times were you hospitalized overnight because of your asthma? 0 0 0       ROS:  Pertinent positives alluded to in the HPI.  Review of systems is negative.    PMH (unchanged from prior visit):  Abnormal Pap smear  Asthma  Tobacco abuse    Lap Cholecystectomy    Social History:  Inhaler Regimen: Spiriva, Dulera  Work History:  at [employer]  Hobbies: golfing  Pets: none  Tobacco Use: stopped 2021, 42py /2 ppd   Home Environment: [facility] daughter and boyfriend    Family history (unchanged from prior visit):  Mother COPD      Current Meds:  Current Outpatient Medications   Medication Sig Dispense Refill    albuterol (PROAIR HFA/PROVENTIL HFA/VENTOLIN HFA) 108 (90 Base) MCG/ACT inhaler INHALE TWO PUFFS BY MOUTH FOUR TIMES DAILY AS NEEDED FOR cough/wheeze/SHORTNESS OF BREATH 18 g 11    atorvastatin (LIPITOR) 10 MG tablet Take 10 mg by mouth daily      DULERA 200-5 MCG/ACT inhaler INHALE 2 PUFFS BY MOUTH TWICE DAILY 13 g 11    gabapentin (NEURONTIN) 300 MG capsule Take 300-600 mg by mouth At Bedtime      hydrochlorothiazide (HYDRODIURIL) 25 MG tablet Take 25 mg by mouth daily      ipratropium - albuterol 0.5 mg/2.5 mg/3 mL (DUONEB) 0.5-2.5 (3) MG/3ML neb solution Take 1 vial (3 mLs) by nebulization every 6 hours as needed for shortness of breath / dyspnea or wheezing 240 mL 11    ipratropium - albuterol 0.5 mg/2.5 mg/3 mL (DUONEB) 0.5-2.5 (3) MG/3ML neb solution 3 mL      metFORMIN (GLUCOPHAGE) 500 MG tablet Take 500 mg by mouth 2 times daily (with meals)      nicotine (NICODERM CQ) 21 MG/24HR 24 hr patch as needed.      polyethylene glycol (MIRALAX) 17 g packet as needed.      SPIRIVA RESPIMAT 2.5 MCG/ACT inhaler Inhale 2 puffs daily. 4 g 11    buPROPion (WELLBUTRIN XL) 150 MG 24 hr tablet Take 1 tablet (150 mg) by mouth 2 times daily for 30 days 60 tablet 0    losartan (COZAAR) 25 MG tablet Take 1 tablet by mouth daily           Labs (unchanged  from prior visit):  No results found for this or any previous visit (from the past 72 hour(s)).    I have personally reviewed all imaging and PFT data available pertinent to this visit.    Imaging studies:  (Unchanged from previous visit)  LOW DOSE LUNG CANCER SCREENING CT CHEST 6/3/2022  Negative for lung cancer screening purposes.    CTA CHEST PE RUN 8/4/2017:  1.  Pulmonary arteries are well-opacified and there is no evidence for pulmonary and was in either lung. No thoracic aortic aneurysm or thoracic aortic dissection.  2.  Atelectasis in the inferior lingula. Lungs are otherwise clear.  3.  Probable benign adenoma right adrenal gland.    PFTs (unchanged from prior visit):  FEV1/FVC is 48% and is reduced.  FEV1 is 40% predicted and is reduced.  FVC is 62% predicted and reduced.  There was improvement in spirometry after a single inhaled dose of bronchodilator.  TLC is 108% predicted and is normal.  RV is 176% predicted and is reduced.  DLCO is 84% predicted and is normal.    Impression:  Full Pulmonary Function Test is abnormal.  PFTs are consistent with severe  obstructive disease.  Bronchodilator response is consistent with reversibility.  There is no hyperinflation.  There is air-trapping.  Diffusion capacity when corrected for hemoglobin is normal.    /78 (BP Location: Left arm, Patient Position: Sitting, Cuff Size: Adult Regular)   Pulse 72   Wt 89.8 kg (198 lb)   SpO2 96%   BMI 36.21 kg/m      Physical Exam  Constitutional:       General: She is not in acute distress.     Appearance: She is not ill-appearing or diaphoretic.   HENT:      Nose: Nose normal.   Cardiovascular:      Rate and Rhythm: Normal rate and regular rhythm.      Pulses: Normal pulses.      Heart sounds: Normal heart sounds.   Pulmonary:      Effort: Pulmonary effort is normal. No respiratory distress.      Breath sounds: Normal breath sounds. No wheezing or rhonchi.   Musculoskeletal:      Comments: Wearing bilateral air  boots, does not appear to have edema     Skin:     General: Skin is warm and dry.      Findings: No rash.   Neurological:      Mental Status: She is alert.   Psychiatric:         Behavior: Behavior normal.       Assessment and Plan:  Asthma COPD overlap syndrome: Compliant with Dulera and is using Spiriva as prescribed.  She continues to have shortness of breath and cough likely exacerbated by her recent increase in smoking.  Note she did have alpha-1 antitrypsin levels checked at a previous visit and these were noted to be normal.  Continue Dulera inhaler twice a day and was reminded to gargle after using using this.  Reminded her to use this twice a day.  Continue Spiriva once a day.  Continue as needed albuterol for rescue.  Elevate head end of her bed when she sleeps.  HCM:   Counseled to receive the COVID and influenza boosters this winter.  Lung Cancer Screening pre-scan counseling Visit  The patient fits the risk profile of patients who benefit from this screening:  The patient is >55 years old and <80 years old  The patient has 42 pack year history (over 30)  The patient has smoked within the past 15 years  The patient has no medical comorbidity severe enough that it would cause mortality prior to mortality due to the lung cancer attempting to be detected.  Discussion with patient regarding the harms associated with LDCT screening include false-negative and false-positive results, incidental findings, overdiagnosis, and radiation exposure were reviewed at length.   The patient understands that pursuing this screening test may result in a biopsy that was not necessary. It may also produced added stress over a nodule that is likely not cancer.  Of 100 patients who get screening, 25 will have a positive scan. Of those 25, only 1 will have cancer.  Overdiagnosis is estimated at 10% of patients-- they would not have been detected in the patient's lifetime without screening. Less than 1% of patients likely had  death related to radiation exposure increase.   Average low-dose CT associated with 0.61 to 1.5 mSv. Annual background radiation exposure in the [place] averages 2.4 mS; mammogram is 0.7mSv.  The benefits are reduction in risk of death from lung cancer. The number needed to treat is 320 (for every 320 patients who undergo screening, 1 patient will have a benefit in mortality from early detection from the screening).  Undergoing this screening implies willingness to pursue further potentially invasive testing to discover potential cancer.  All questions were answered.  The patient was counseled regarding smoking cessation and its risk for lung cancer.  Follow-up: 12  months.      Fide Ambriz MD  Pulmonary and Critical Care  (p) [phone]

## 2024-08-27 NOTE — PATIENT INSTRUCTIONS
Congratulations on quitting smoking!!    Please use your Dulera inhaler every 12 hours whether or not you are short of breath, this is not a rescue inhaler so do not use this if you are acutely short of breath.  Please be sure to gargle your mouth after using your Dulera inhaler.  Please use your Spiriva once a day every day whether or not you are short of breath, this is not a rescue inhaler so do not use this if you are acutely short of breath.  Please use your albuterol inhaler 2 puffs up to 6 times a day for rescue. If you are not short of breath, you do not need to use this.

## 2024-12-01 ENCOUNTER — HEALTH MAINTENANCE LETTER (OUTPATIENT)
Age: 59
End: 2024-12-01

## 2025-01-12 ENCOUNTER — HEALTH MAINTENANCE LETTER (OUTPATIENT)
Age: 60
End: 2025-01-12

## 2025-03-15 ENCOUNTER — HEALTH MAINTENANCE LETTER (OUTPATIENT)
Age: 60
End: 2025-03-15

## 2025-06-28 ENCOUNTER — HEALTH MAINTENANCE LETTER (OUTPATIENT)
Age: 60
End: 2025-06-28

## 2025-07-23 ENCOUNTER — OFFICE VISIT (OUTPATIENT)
Dept: PULMONOLOGY | Facility: CLINIC | Age: 60
End: 2025-07-23
Attending: INTERNAL MEDICINE
Payer: COMMERCIAL

## 2025-07-23 VITALS
HEART RATE: 83 BPM | SYSTOLIC BLOOD PRESSURE: 152 MMHG | OXYGEN SATURATION: 97 % | BODY MASS INDEX: 32.45 KG/M2 | DIASTOLIC BLOOD PRESSURE: 76 MMHG | WEIGHT: 177.4 LBS

## 2025-07-23 DIAGNOSIS — Z71.6 ENCOUNTER FOR TOBACCO USE CESSATION COUNSELING: ICD-10-CM

## 2025-07-23 DIAGNOSIS — J44.89 ASTHMA-COPD OVERLAP SYNDROME (H): Primary | ICD-10-CM

## 2025-07-23 DIAGNOSIS — Z72.0 TOBACCO ABUSE: ICD-10-CM

## 2025-07-23 PROCEDURE — 99213 OFFICE O/P EST LOW 20 MIN: CPT | Performed by: INTERNAL MEDICINE

## 2025-07-23 PROCEDURE — 3077F SYST BP >= 140 MM HG: CPT | Performed by: INTERNAL MEDICINE

## 2025-07-23 PROCEDURE — G2211 COMPLEX E/M VISIT ADD ON: HCPCS | Performed by: INTERNAL MEDICINE

## 2025-07-23 PROCEDURE — 3078F DIAST BP <80 MM HG: CPT | Performed by: INTERNAL MEDICINE

## 2025-07-23 RX ORDER — ONDANSETRON 4 MG/1
TABLET, ORALLY DISINTEGRATING ORAL
COMMUNITY
Start: 2025-04-25

## 2025-07-23 RX ORDER — POTASSIUM CHLORIDE 1500 MG/1
TABLET, EXTENDED RELEASE ORAL
COMMUNITY

## 2025-07-23 RX ORDER — OMEGA-3 FATTY ACIDS/FISH OIL 300-1000MG
200 CAPSULE ORAL EVERY 6 HOURS
COMMUNITY

## 2025-07-23 ASSESSMENT — ASTHMA QUESTIONNAIRES
QUESTION_5 LAST FOUR WEEKS HOW WOULD YOU RATE YOUR ASTHMA CONTROL: SOMEWHAT CONTROLLED
QUESTION_2 LAST FOUR WEEKS HOW OFTEN HAVE YOU HAD SHORTNESS OF BREATH: MORE THAN ONCE A DAY
QUESTION_1 LAST FOUR WEEKS HOW MUCH OF THE TIME DID YOUR ASTHMA KEEP YOU FROM GETTING AS MUCH DONE AT WORK, SCHOOL OR AT HOME: NONE OF THE TIME
QUESTION_4 LAST FOUR WEEKS HOW OFTEN HAVE YOU USED YOUR RESCUE INHALER OR NEBULIZER MEDICATION (SUCH AS ALBUTEROL): ONE OR TWO TIMES PER DAY
QUESTION_3 LAST FOUR WEEKS HOW OFTEN DID YOUR ASTHMA SYMPTOMS (WHEEZING, COUGHING, SHORTNESS OF BREATH, CHEST TIGHTNESS OR PAIN) WAKE YOU UP AT NIGHT OR EARLIER THAN USUAL IN THE MORNING: NOT AT ALL
ACT_TOTALSCORE: 16

## 2025-07-23 NOTE — PROGRESS NOTES
CCx: Follow-up visit for asthma/COPD    HPI:Rosa Arreguin is a 60 year old  female who presents to clinic the aforementioned chief complaint. Since her last visit with me in August 2024 she states that her breathing is actually been quite good.  At that visit she had quit smoking but unfortunately, took this off again at the end of May 2025.  She continues to be confused with her inhaler long-acting bronchodilator use and has been using her mometasone-formoterol only once a day and to Tropium once a day.  She typically uses albuterol first thing in the morning after waking up.  She had been initiated on semaglutide last year in June but was unfortunately unable to tolerate this due to severe nausea and vomiting.  She is frustrated because she had lost about 30 pounds while on Ozempic.  Separately, she unfortunately forgot to schedule her low-dose lung cancer screening CT scan last year.    From prior visit:  She had been diagnosed with asthma approximately 20 years ago, and COPD approximately 15 years ago.  For quite some time, she was using only an albuterol inhaler and was doing quite well.  However because of worsening symptoms, patient was tried on several inhalers including Dulera and Symbicort.  She does not remember why she stopped taking Advair, she says she had a reaction to the Symbicort and stopped taking that. She does not need any help with any of her activities of daily living.  However, she is found it harder to keep it with her job as a  subsequently taken several days off of work.  She usually comes down with a cold or bronchitis twice a year receives steroids during those times.  Humidity and bending over may seem to make her breathing worse.  She otherwise denies any other specific alleviating or aggravating factors.          8/31/2022     2:00 PM 8/27/2024     1:33 PM 7/23/2025     9:48 AM   ACT Total Scores   ACT TOTAL SCORE (Goal Greater than or Equal to 20) 15 16 16    In the past  12 months, how many times did you visit the emergency room for your asthma without being admitted to the hospital? 0 0 0   In the past 12 months, how many times were you hospitalized overnight because of your asthma? 0 0 0       Patient-reported       ROS:  Pertinent positives alluded to in the HPI.  Review of systems is negative.    PMH (unchanged from prior visit):  Abnormal Pap smear  Asthma  Tobacco abuse    Lap Cholecystectomy    Social History:  Inhaler Regimen: Spiriva, Dulera  Work History:  at [employer]  Hobbies: [employer]  Pets: none  Tobacco Use: stopped 2021, 42py  ppd   Home Environment: [facility] daughter and boyfriend    Family history (unchanged from prior visit):  Mother COPD      Current Meds:  Current Outpatient Medications   Medication Sig Dispense Refill    albuterol (PROAIR HFA/PROVENTIL HFA/VENTOLIN HFA) 108 (90 Base) MCG/ACT inhaler INHALE TWO PUFFS BY MOUTH FOUR TIMES DAILY AS NEEDED FOR cough/wheeze/SHORTNESS OF BREATH 18 g 11    atorvastatin (LIPITOR) 10 MG tablet Take 10 mg by mouth daily      DULERA 200-5 MCG/ACT inhaler INHALE 2 PUFFS BY MOUTH TWICE DAILY 13 g 11    empagliflozin (JARDIANCE) 10 MG TABS tablet Take 10 mg by mouth daily.      gabapentin (NEURONTIN) 300 MG capsule Take 300-600 mg by mouth At Bedtime      losartan (COZAAR) 25 MG tablet Take 1 tablet by mouth daily      metFORMIN (GLUCOPHAGE) 500 MG tablet Take 500 mg by mouth 2 times daily (with meals)      nicotine (NICODERM CQ) 21 MG/24HR 24 hr patch as needed.      ondansetron (ZOFRAN ODT) 4 MG ODT tab DISSOLVE 1 TABLET (4MG) ON THE TONGUE EVERY 8 HOURS IF NEEDED FOR NAUSEA/VOMITING      SPIRIVA RESPIMAT 2.5 MCG/ACT inhaler Inhale 2 puffs daily. 4 g 11    buPROPion (WELLBUTRIN XL) 150 MG 24 hr tablet Take 1 tablet (150 mg) by mouth 2 times daily for 30 days (Patient not taking: Reported on 2025) 60 tablet 0    hydrochlorothiazide (HYDRODIURIL) 25 MG tablet Take 25 mg by mouth  daily (Patient not taking: Reported on 7/23/2025)      ibuprofen (ADVIL/MOTRIN) 200 MG capsule Take 200 mg by mouth every 6 hours.      ipratropium - albuterol 0.5 mg/2.5 mg/3 mL (DUONEB) 0.5-2.5 (3) MG/3ML neb solution Take 1 vial (3 mLs) by nebulization every 6 hours as needed for shortness of breath / dyspnea or wheezing (Patient not taking: Reported on 7/23/2025) 240 mL 11    KLOR-CON M20 20 MEQ CR tablet Take 1 Tablet (20 mEq) by mouth once daily.*      polyethylene glycol (MIRALAX) 17 g packet as needed. (Patient not taking: Reported on 7/23/2025)           Labs (unchanged from prior visit):  No results found for this or any previous visit (from the past 72 hour(s)).    I have personally reviewed all imaging and PFT data available pertinent to this visit.    Imaging studies:  (Unchanged from previous visit)  LOW DOSE LUNG CANCER SCREENING CT CHEST 6/3/2022  Negative for lung cancer screening purposes.    CTA CHEST PE RUN 8/4/2017:  1.  Pulmonary arteries are well-opacified and there is no evidence for pulmonary and was in either lung. No thoracic aortic aneurysm or thoracic aortic dissection.  2.  Atelectasis in the inferior lingula. Lungs are otherwise clear.  3.  Probable benign adenoma right adrenal gland.    PFTs (unchanged from prior visit):  FEV1/FVC is 48% and is reduced.  FEV1 is 40% predicted and is reduced.  FVC is 62% predicted and reduced.  There was improvement in spirometry after a single inhaled dose of bronchodilator.  TLC is 108% predicted and is normal.  RV is 176% predicted and is reduced.  DLCO is 84% predicted and is normal.    Impression:  Full Pulmonary Function Test is abnormal.  PFTs are consistent with severe  obstructive disease.  Bronchodilator response is consistent with reversibility.  There is no hyperinflation.  There is air-trapping.  Diffusion capacity when corrected for hemoglobin is normal.    BP (!) 152/76 (BP Location: Right arm, Patient Position: Sitting, Cuff Size: Adult  Large)   Pulse 83   Wt 80.5 kg (177 lb 6.4 oz)   SpO2 97%   BMI 32.45 kg/m      Physical Exam  Constitutional:       General: She is not in acute distress.     Appearance: She is not ill-appearing or diaphoretic.   HENT:      Nose: Nose normal.   Cardiovascular:      Rate and Rhythm: Normal rate and regular rhythm.      Pulses: Normal pulses.      Heart sounds: Normal heart sounds.   Pulmonary:      Effort: Pulmonary effort is normal. No respiratory distress.      Breath sounds: Normal breath sounds. No wheezing or rhonchi.   Musculoskeletal:      Comments: Wearing bilateral air boots, does not appear to have edema     Skin:     General: Skin is warm and dry.      Findings: No rash.   Neurological:      Mental Status: She is alert.   Psychiatric:         Behavior: Behavior normal.       Assessment and Plan:  Asthma COPD overlap syndrome: Compliant with Dulera and is using Spiriva as prescribed.  She continues to have shortness of breath and cough likely exacerbated by her recent increase in smoking.  Note she did have alpha-1 antitrypsin levels checked at a previous visit and these were noted to be normal.  Continue Dulera inhaler twice a day and was reminded to gargle after using using this.  Reminded her to use this twice a day.  Continue Spiriva once a day.  Continue as needed albuterol for rescue.  Elevate head end of her bed when she sleeps.  HCM:   Counseled to receive the COVID and influenza boosters this winter.  Lung Cancer Screening pre-scan counseling Visit  The patient fits the risk profile of patients who benefit from this screening:  The patient is >55 years old and <80 years old  The patient has 42 pack year history (over 30)  The patient has smoked within the past 15 years  The patient has no medical comorbidity severe enough that it would cause mortality prior to mortality due to the lung cancer attempting to be detected.  Discussion with patient regarding the harms associated with LDCT  screening include false-negative and false-positive results, incidental findings, overdiagnosis, and radiation exposure were reviewed at length.   The patient understands that pursuing this screening test may result in a biopsy that was not necessary. It may also produced added stress over a nodule that is likely not cancer.  Of 100 patients who get screening, 25 will have a positive scan. Of those 25, only 1 will have cancer.  Overdiagnosis is estimated at 10% of patients-- they would not have been detected in the patient's lifetime without screening. Less than 1% of patients likely had death related to radiation exposure increase.   Average low-dose CT associated with 0.61 to 1.5 mSv. Annual background radiation exposure in the [place] averages 2.4 mS; mammogram is 0.7mSv.  The benefits are reduction in risk of death from lung cancer. The number needed to treat is 320 (for every 320 patients who undergo screening, 1 patient will have a benefit in mortality from early detection from the screening).  Undergoing this screening implies willingness to pursue further potentially invasive testing to discover potential cancer.  All questions were answered.  The patient was counseled regarding smoking cessation and its risk for lung cancer for greater than 5 and less than 10 minutes.  Follow-up: 6  months.      Fide Ambriz MD  Pulmonary and Critical Care  [facility] (formerly [facility])    The longitudinal plan of care for the diagnosis(es)/condition(s) as documented were addressed during this visit. Due to the added complexity in care, I will continue to support Roas in the subsequent management and with ongoing continuity of care.

## 2025-07-23 NOTE — PATIENT INSTRUCTIONS
Please use your Dulera inhaler twice a day every day whether or not you are short of breath, this is not a rescue inhaler so do not use this if you are acutely short of breath.  Please be sure to gargle your mouth after using your Dulera inhaler.  Please use your Spiriva once a day every day whether or not you are short of breath, this is not a rescue inhaler so do not use this if you are acutely short of breath.  Please use your albuterol inhaler 2 puffs up to 6 times a day for rescue. If you are not short of breath, you do not need to use this.

## 2025-07-27 ENCOUNTER — HOSPITAL ENCOUNTER (EMERGENCY)
Facility: CLINIC | Age: 60
Discharge: HOME | End: 2025-07-27
Attending: EMERGENCY MEDICINE | Admitting: EMERGENCY MEDICINE
Payer: COMMERCIAL

## 2025-07-27 VITALS
OXYGEN SATURATION: 99 % | RESPIRATION RATE: 16 BRPM | BODY MASS INDEX: 32.66 KG/M2 | DIASTOLIC BLOOD PRESSURE: 83 MMHG | TEMPERATURE: 97.5 F | WEIGHT: 177.5 LBS | HEART RATE: 82 BPM | HEIGHT: 62 IN | SYSTOLIC BLOOD PRESSURE: 193 MMHG

## 2025-07-27 DIAGNOSIS — K04.7 DENTAL INFECTION: Primary | ICD-10-CM

## 2025-07-27 PROCEDURE — 250N000013 HC RX MED GY IP 250 OP 250 PS 637: Performed by: EMERGENCY MEDICINE

## 2025-07-27 PROCEDURE — 99283 EMERGENCY DEPT VISIT LOW MDM: CPT | Performed by: EMERGENCY MEDICINE

## 2025-07-27 RX ORDER — CLINDAMYCIN HYDROCHLORIDE 150 MG/1
300 CAPSULE ORAL ONCE
Status: COMPLETED | OUTPATIENT
Start: 2025-07-27 | End: 2025-07-27

## 2025-07-27 RX ORDER — CLINDAMYCIN HYDROCHLORIDE 300 MG/1
300 CAPSULE ORAL 3 TIMES DAILY
Qty: 21 CAPSULE | Refills: 0 | Status: SHIPPED | OUTPATIENT
Start: 2025-07-27 | End: 2025-08-03

## 2025-07-27 RX ADMIN — CLINDAMYCIN HYDROCHLORIDE 300 MG: 150 CAPSULE ORAL at 04:03

## 2025-07-27 ASSESSMENT — COLUMBIA-SUICIDE SEVERITY RATING SCALE - C-SSRS
2. HAVE YOU ACTUALLY HAD ANY THOUGHTS OF KILLING YOURSELF IN THE PAST MONTH?: NO
6. HAVE YOU EVER DONE ANYTHING, STARTED TO DO ANYTHING, OR PREPARED TO DO ANYTHING TO END YOUR LIFE?: NO
1. IN THE PAST MONTH, HAVE YOU WISHED YOU WERE DEAD OR WISHED YOU COULD GO TO SLEEP AND NOT WAKE UP?: NO

## 2025-07-27 ASSESSMENT — ACTIVITIES OF DAILY LIVING (ADL): ADLS_ACUITY_SCORE: 41

## 2025-07-27 NOTE — ED PROVIDER NOTES
EMERGENCY DEPARTMENT ENCOUNTER      NAME: Rosa Arreguin  AGE: 60 year old female  YOB: 1965  MRN: [id number]  EVALUATION DATE & TIME: 7/27/2025  3:42 AM    PCP: Flores Bess    ED PROVIDER: Rajesh Pereira M.D.      Chief Complaint   Patient presents with    Dental Pain         FINAL IMPRESSION:  1. Dental infection          ED COURSE & MEDICAL DECISION MAKING:    Pertinent Labs & Imaging studies reviewed. (See chart for details)  60 year old female presents to the Emergency Department for evaluation of dental pain.  Patient broke tooth #18 approximately a week and a half ago.  Started have pain in that area this morning.  On examination tooth is broken there is some erythema at the base of this tooth in addition to tooth #19.  No drainable abscess.  No trismus.  Will start her on clindamycin as she is allergic to penicillin.  Continue Tylenol and ibuprofen for pain.  Follow-up with a dentist as soon as possible.  Return for worsening symptoms.    3:50 AM I met with the patient to gather history and to perform my initial exam. I discussed the plan for care while in the Emergency Department.   3:58 AM We discussed the plan for discharge and the patient is agreeable. Reviewed supportive cares, symptomatic treatment, outpatient follow up, and reasons to return to the Emergency Department. All questions and concerns were addressed. Patient to be discharged by ED RN.       At the conclusion of the encounter I discussed the results of all of the tests and the disposition. The questions were answered. The patient or family acknowledged understanding and was agreeable with the care plan.     Medical Decision Making    Discharge. I prescribed additional prescription strength medication(s) as charted. See documentation for any additional details.    MIPS:  Dental Pain:MDM for Pain Management: No opiates were prescribed as recommended by ACEP to reduce patient harm related to the opioid  crisis.    SEPSIS: None             MEDICATIONS GIVEN IN THE EMERGENCY:  Medications - No data to display    NEW PRESCRIPTIONS STARTED AT TODAY'S ER VISIT  New Prescriptions    CLINDAMYCIN (CLEOCIN) 300 MG CAPSULE    Take 1 capsule (300 mg) by mouth 3 times daily for 7 days.          =================================================================    HPI    Patient information was obtained from: patient     Use of : N/A         Rosa Arreguin is a 60 year old female with a pertinent history of asthma, tobacco abuse, hypercholesterolemia, depression, hypertension, and type 2 diabetes, who presents to this ED for evaluation of dental pain.    Patient reports she broke one of her teeth on the left side a few weeks ago. She states there was no pain when this happened. She noticed the pain yesterday (25) morning and it got progressively worse throughout the day. Patient has a dentist she sees. She took advil at 3:30 AM.     Patient denies fevers, chills, or any other complaints at this time.         PAST MEDICAL HISTORY:  Past Medical History:   Diagnosis Date    Asthma     COPD (chronic obstructive pulmonary disease) (H)     Diabetes mellitus (H)     Hypertension        PAST SURGICAL HISTORY:  Past Surgical History:   Procedure Laterality Date     SECTION      MOUTH SURGERY      ZZC LAP,CHOLECYSTECTOMY/EXPLORE N/A 2021    Procedure: LAPAROSCOPIC CHOLECYSTECTOMY;  Surgeon: Wm Watson MD;  Location: [facility];  Service: General           CURRENT MEDICATIONS:    No current facility-administered medications for this encounter.     Current Outpatient Medications   Medication Sig Dispense Refill    clindamycin (CLEOCIN) 300 MG capsule Take 1 capsule (300 mg) by mouth 3 times daily for 7 days. 21 capsule 0    albuterol (PROAIR HFA/PROVENTIL HFA/VENTOLIN HFA) 108 (90 Base) MCG/ACT inhaler INHALE TWO PUFFS BY MOUTH FOUR TIMES DAILY AS NEEDED FOR cough/wheeze/SHORTNESS OF BREATH 18  g 11    atorvastatin (LIPITOR) 10 MG tablet Take 10 mg by mouth daily      buPROPion (WELLBUTRIN XL) 150 MG 24 hr tablet Take 1 tablet (150 mg) by mouth 2 times daily for 30 days (Patient not taking: Reported on 7/23/2025) 60 tablet 0    DULERA 200-5 MCG/ACT inhaler INHALE 2 PUFFS BY MOUTH TWICE DAILY 13 g 11    empagliflozin (JARDIANCE) 10 MG TABS tablet Take 10 mg by mouth daily.      gabapentin (NEURONTIN) 300 MG capsule Take 300-600 mg by mouth At Bedtime      hydrochlorothiazide (HYDRODIURIL) 25 MG tablet Take 25 mg by mouth daily (Patient not taking: Reported on 7/23/2025)      ibuprofen (ADVIL/MOTRIN) 200 MG capsule Take 200 mg by mouth every 6 hours.      ipratropium - albuterol 0.5 mg/2.5 mg/3 mL (DUONEB) 0.5-2.5 (3) MG/3ML neb solution Take 1 vial (3 mLs) by nebulization every 6 hours as needed for shortness of breath / dyspnea or wheezing (Patient not taking: Reported on 7/23/2025) 240 mL 11    KLOR-CON M20 20 MEQ CR tablet Take 1 Tablet (20 mEq) by mouth once daily.*      losartan (COZAAR) 25 MG tablet Take 1 tablet by mouth daily      metFORMIN (GLUCOPHAGE) 500 MG tablet Take 500 mg by mouth 2 times daily (with meals)      nicotine (NICODERM CQ) 21 MG/24HR 24 hr patch as needed.      ondansetron (ZOFRAN ODT) 4 MG ODT tab DISSOLVE 1 TABLET (4MG) ON THE TONGUE EVERY 8 HOURS IF NEEDED FOR NAUSEA/VOMITING      polyethylene glycol (MIRALAX) 17 g packet as needed. (Patient not taking: Reported on 7/23/2025)      SPIRIVA RESPIMAT 2.5 MCG/ACT inhaler Inhale 2 puffs daily. 4 g 11         ALLERGIES:  Allergies   Allergen Reactions    Amoxicillin Rash     Patient notes she had previously tolerated this until recently when she was prescribed a higher dose    Lisinopril Cough       FAMILY HISTORY:  Family History   Problem Relation Age of Onset    Breast Cancer Paternal Aunt     Breast Cancer Other         paternal cousin       SOCIAL HISTORY:   Social History     Socioeconomic History    Marital status: Single  "  Tobacco Use    Smoking status: Every Day     Current packs/day: 0.25     Average packs/day: 0.2 packs/day for 43.0 years (10.7 ttl pk-yrs)     Types: Cigars, cigarillos or filtered cigars, Cigarettes     Start date: 1/4/2022    Smokeless tobacco: Never    Tobacco comments:     1-2 per day   Substance and Sexual Activity    Alcohol use: Yes     Comment: Alcoholic Drinks/day: occassionally    Drug use: Never     Social Drivers of Health     Financial Resource Strain: Low Risk  (12/5/2024)    [facility]    Financial Resource Strain     Difficulty of Paying Living Expenses: 3   Food Insecurity: No Food Insecurity (12/5/2024)    [facility]    Food Insecurity     Do you worry your food will run out before you are able to buy more?: 1   Transportation Needs: No Transportation Needs (12/5/2024)    Received from [facility]    Transportation Needs     Does lack of transportation keep you from medical appointments?: 1     Does lack of transportation keep you from work, meetings or getting things that you need?: 1   Social Connections: Socially Integrated (12/5/2024)    Received from [facility]    Social Connections     Do you often feel lonely or isolated from those around you?: 0   Housing Stability: Low Risk  (12/5/2024)    [facility]    Housing Stability     What is your housing situation today?: 1       VITALS:  BP (!) 193/83   Pulse 82   Temp 97.5  F (36.4  C) (Oral)   Resp 16   Ht 1.575 m (5' 2\")   Wt 80.5 kg (177 lb 8 oz)   SpO2 99%   BMI 32.47 kg/m      PHYSICAL EXAM    Physical Exam  Vitals and nursing note reviewed.   Constitutional:       General: She is not in acute distress.     Appearance: She is not diaphoretic.   HENT:      Head: Atraumatic.      Mouth/Throat:      Pharynx: No oropharyngeal " exudate.      Comments: 18.  Is broken.  There is multiple caries throughout her tooth.  Overall poor dentition.  There is erythema at the base of tooth #18 and 19.  Tooth #19 is tender.  Eyes:      General: No scleral icterus.     Pupils: Pupils are equal, round, and reactive to light.   Cardiovascular:      Rate and Rhythm: Normal rate and regular rhythm.      Heart sounds: Normal heart sounds.   Pulmonary:      Effort: No respiratory distress.      Breath sounds: Normal breath sounds.   Abdominal:      Palpations: Abdomen is soft.      Tenderness: There is no abdominal tenderness. There is no guarding or rebound. Negative signs include Sandhu's sign.   Musculoskeletal:         General: No tenderness.   Skin:     General: Skin is warm.      Findings: No rash.   Neurological:      General: No focal deficit present.      Mental Status: She is alert.           LAB:  All pertinent labs reviewed and interpreted.  Labs Ordered and Resulted from Time of ED Arrival to Time of ED Departure - No data to display    RADIOLOGY:  Reviewed all pertinent imaging. Please see official radiology report.  No orders to display           I, Fernando Evans, am serving as a scribe to document services personally performed by Dr. Rajesh Pereira, based on my observation and the provider's statements to me. IRajesh MD attest that Fernando Evans is acting in a scribe capacity, has observed my performance of the services and has documented them in accordance with my direction.    Rajesh Pereira M.D.  Emergency Medicine  [facility] EMERGENCY ROOM  [address] [address]  [phone]  Dept: [phone]       Rajesh Pereira MD  07/27/25 0412

## 2025-07-27 NOTE — ED TRIAGE NOTES
Pt arrives to ed with c/o of left sided lower dental pain. Broke tooth a week ago.     Triage Assessment (Adult)       Row Name 07/27/25 0345          Triage Assessment    Airway WDL WDL        Respiratory WDL    Respiratory WDL WDL        Cardiac WDL    Cardiac WDL WDL        Cognitive/Neuro/Behavioral WDL    Cognitive/Neuro/Behavioral WDL WDL